# Patient Record
Sex: FEMALE | Race: ASIAN
[De-identification: names, ages, dates, MRNs, and addresses within clinical notes are randomized per-mention and may not be internally consistent; named-entity substitution may affect disease eponyms.]

---

## 2019-04-08 ENCOUNTER — HOSPITAL ENCOUNTER (INPATIENT)
Dept: HOSPITAL 5 - ED | Age: 74
LOS: 3 days | Discharge: HOME | DRG: 682 | End: 2019-04-11
Attending: INTERNAL MEDICINE | Admitting: INTERNAL MEDICINE
Payer: MEDICARE

## 2019-04-08 DIAGNOSIS — E03.9: ICD-10-CM

## 2019-04-08 DIAGNOSIS — E87.5: ICD-10-CM

## 2019-04-08 DIAGNOSIS — Z79.899: ICD-10-CM

## 2019-04-08 DIAGNOSIS — E11.22: ICD-10-CM

## 2019-04-08 DIAGNOSIS — Z99.2: ICD-10-CM

## 2019-04-08 DIAGNOSIS — Z82.49: ICD-10-CM

## 2019-04-08 DIAGNOSIS — N25.81: ICD-10-CM

## 2019-04-08 DIAGNOSIS — Z83.3: ICD-10-CM

## 2019-04-08 DIAGNOSIS — E43: ICD-10-CM

## 2019-04-08 DIAGNOSIS — I12.0: Primary | ICD-10-CM

## 2019-04-08 DIAGNOSIS — E87.2: ICD-10-CM

## 2019-04-08 DIAGNOSIS — N18.6: ICD-10-CM

## 2019-04-08 DIAGNOSIS — D63.1: ICD-10-CM

## 2019-04-08 DIAGNOSIS — Z79.4: ICD-10-CM

## 2019-04-08 DIAGNOSIS — E83.51: ICD-10-CM

## 2019-04-08 DIAGNOSIS — I16.0: ICD-10-CM

## 2019-04-08 LAB
ALBUMIN SERPL-MCNC: 2.8 G/DL (ref 3.9–5)
ALT SERPL-CCNC: 103 UNITS/L (ref 7–56)
BASOPHILS # (AUTO): 0.1 K/MM3 (ref 0–0.1)
BASOPHILS NFR BLD AUTO: 1.5 % (ref 0–1.8)
BUN SERPL-MCNC: 103 MG/DL (ref 7–17)
BUN/CREAT SERPL: 8 %
CALCIUM SERPL-MCNC: 5.6 MG/DL (ref 8.4–10.2)
EOSINOPHIL # BLD AUTO: 0.1 K/MM3 (ref 0–0.4)
EOSINOPHIL NFR BLD AUTO: 2.1 % (ref 0–4.3)
HCT VFR BLD CALC: 27.9 % (ref 30.3–42.9)
HEMOLYSIS INDEX: 4
HGB BLD-MCNC: 9 GM/DL (ref 10.1–14.3)
INR PPP: 0.95 (ref 0.87–1.13)
LYMPHOCYTES # BLD AUTO: 0.7 K/MM3 (ref 1.2–5.4)
LYMPHOCYTES NFR BLD AUTO: 13.8 % (ref 13.4–35)
MCHC RBC AUTO-ENTMCNC: 32 % (ref 30–34)
MCV RBC AUTO: 90 FL (ref 79–97)
MONOCYTES # (AUTO): 0.4 K/MM3 (ref 0–0.8)
MONOCYTES % (AUTO): 7.3 % (ref 0–7.3)
PLATELET # BLD: 345 K/MM3 (ref 140–440)
RBC # BLD AUTO: 3.09 M/MM3 (ref 3.65–5.03)

## 2019-04-08 PROCEDURE — C1750 CATH, HEMODIALYSIS,LONG-TERM: HCPCS

## 2019-04-08 PROCEDURE — 76937 US GUIDE VASCULAR ACCESS: CPT

## 2019-04-08 PROCEDURE — 85610 PROTHROMBIN TIME: CPT

## 2019-04-08 PROCEDURE — 85025 COMPLETE CBC W/AUTO DIFF WBC: CPT

## 2019-04-08 PROCEDURE — 93970 EXTREMITY STUDY: CPT

## 2019-04-08 PROCEDURE — 71046 X-RAY EXAM CHEST 2 VIEWS: CPT

## 2019-04-08 PROCEDURE — 83970 ASSAY OF PARATHORMONE: CPT

## 2019-04-08 PROCEDURE — C1769 GUIDE WIRE: HCPCS

## 2019-04-08 PROCEDURE — 83036 HEMOGLOBIN GLYCOSYLATED A1C: CPT

## 2019-04-08 PROCEDURE — 80074 ACUTE HEPATITIS PANEL: CPT

## 2019-04-08 PROCEDURE — 80053 COMPREHEN METABOLIC PANEL: CPT

## 2019-04-08 PROCEDURE — 99285 EMERGENCY DEPT VISIT HI MDM: CPT

## 2019-04-08 PROCEDURE — 77001 FLUOROGUIDE FOR VEIN DEVICE: CPT

## 2019-04-08 PROCEDURE — 36558 INSERT TUNNELED CV CATH: CPT

## 2019-04-08 PROCEDURE — 82962 GLUCOSE BLOOD TEST: CPT

## 2019-04-08 PROCEDURE — 36415 COLL VENOUS BLD VENIPUNCTURE: CPT

## 2019-04-08 RX ADMIN — HEPARIN SODIUM SCH UNIT: 5000 INJECTION, SOLUTION INTRAVENOUS; SUBCUTANEOUS at 22:23

## 2019-04-08 RX ADMIN — Medication SCH ML: at 22:24

## 2019-04-08 RX ADMIN — CARVEDILOL SCH MG: 12.5 TABLET, FILM COATED ORAL at 22:22

## 2019-04-08 RX ADMIN — CALCIUM CARBONATE (ANTACID) CHEW TAB 500 MG SCH MG: 500 CHEW TAB at 21:13

## 2019-04-08 NOTE — HISTORY AND PHYSICAL REPORT
History of Present Illness


Chief complaint: 





I need dialysis


History of present illness: 


72 YO Female with ESRD previously non accepting of dialysis, HTN, DM presents to

ED for evaluation. Pt states that she was informed several months ago that she 

needed dialysis but wanted to undergo kidney transplantation. Pt states that she

has experienced nausea, fatigue, generalized weakness, and not feeling well over

the past 1 month with worsening symptoms over the past. Pt awoke this morning 

"feeling bad" and decided to undergo dialysis as previously advised. Pt 

transported to [facility] [place] vehicle. Pt seen and evaluated in ED and found

to have ESRD, Acidosis, Hyperkalemia without EKG changes, and Anemia of Chronic 

Disease. Pt admitted to medical floor with remote telemetry. Nephrology 

consulted in ED. Vascular Surgery consulted for permacath placement. No prior 

admissions for review. All listed medication reconciled at time of admission. Pt

denies fever, chills, CP, Palpitations, shortness of breath, leg swelling, calf 

pain, prolonged travel/immobility, individual/family history of DVT/PE/Blood 

Clotting Disorder, vertigo, headache, vision changes, extremity numbness, or 

recent ill contacts. 








Past History


Past Medical History: diabetes, ESRD, hypertension, hypothyroidism


Past Surgical History: 


Social history: .  denies: smoking, alcohol abuse, prescription drug 

abuse


Family history: diabetes, hypertension





Medications and Allergies


                                    Allergies











Allergy/AdvReac Type Severity Reaction Status Date / Time


 


No Known Allergies Allergy   Unverified 16 15:27











                                Home Medications











 Medication  Instructions  Recorded  Confirmed  Last Taken  Type


 


Carvedilol [Coreg] 25 mg PO BID 19 Unknown History


 


Furosemide [Lasix TAB] 40 mg PO QDAY 19 Unknown History


 


Hydralazine HCl 50 mg PO TID 19 Unknown History


 


Insulin Glargine,Hum.rec.anlog 14 units SQ DAILY 19 Unknown 

History





[Lantus]     


 


Levothyroxine [Synthroid] 75 mcg PO QAM 19 Unknown History


 


Rosuvastatin Calcium 5 mg PO DAILY 19 Unknown History


 


glipiZIDE [Glipizide] 5 mg PO DAILY 19 Unknown History














Review of Systems


Constitutional: no weight loss, no weight gain, no fever, no chills


Ears, nose, mouth and throat: no ear pain, no ear discharge, no tinnitis, no 

decreased hearing, no nose pain


Breasts: no change in shape, no swelling, no mass


Cardiovascular: no chest pain, no orthopnea, no palpitations, no rapid/irregular

 heart beat, no edema


Respiratory: no cough, no cough with sputum, no excessive sputum, no hemoptysis,

 no shortness of breath


Gastrointestinal: nausea, no vomiting, no diarrhea, no constipation


Genitourinary Female: no pelvic pain, no flank pain, no menorrhagia, no dysuria,

 no urinary frequency, no urgency


Rectal: no pain, no incontinence, no bleeding


Musculoskeletal: no neck pain, no shooting arm pain, no arm numbness/tingling, 

no low back pain, no shooting leg pain, no leg numbness/tingling


Integumentary: no rash, no pruritis, no redness, no sores, no wounds


Neurological: no paralysis, no weakness, no parathesias, no numbness, no 

tingling


Psychiatric: no anxiety, no memory loss, no change in sleep habits, no sleep 

disturbances, no insomnia, no hypersomnia, no change in appetite


Endocrine: no cold intolerance, no heat intolerance, no polyuria, no nocturia, 

no excessive sweating


Hematologic/Lymphatic: no easy bruising, no easy bleeding, no lymphadenopathy, 

no lymphedema


Allergic/Immunologic: no urticaria, no allergic rhinitis, no wheezing, no 

persistent infections, no anaphylaxis, no angioedema





Exam





- Constitutional


Vitals: 


                                        











Temp Pulse Resp BP Pulse Ox


 


 98.2 F   71   16   194/90   96 


 


 19 09:22  19 09:22  19 09:22  19 09:22  19 09:22











General appearance: Present: no acute distress





- EENT


Eyes: Present: PERRL


ENT: hearing intact, clear oral mucosa





- Neck


Neck: Present: supple, normal ROM





- Respiratory


Respiratory effort: normal


Respiratory: bilateral: CTA





- Cardiovascular


Heart Sounds: Present: S1 & S2.  Absent: rub, click





- Extremities


Extremities: pulses symmetrical, No edema


Peripheral Pulses: within normal limits





- Abdominal


General gastrointestinal: Present: soft, non-tender, non-distended, normal bowel

 sounds


Female genitourinary: Present: normal





- Integumentary


Integumentary: Present: clear, warm, dry





- Musculoskeletal


Musculoskeletal: gait normal, strength equal bilaterally





- Psychiatric


Psychiatric: appropriate mood/affect, intact judgment & insight





- Neurologic


Neurologic: CNII-XII intact, moves all extremities





Results





- Labs


CBC & Chem 7: 


                                 19 09:28





                                 19 09:31


Labs: 


                              Abnormal lab results











  19 Range/Units





  09:28 09:31 


 


RBC  3.09 L   (3.65-5.03)  M/mm3


 


Hgb  9.0 L   (10.1-14.3)  gm/dl


 


Hct  27.9 L   (30.3-42.9)  %


 


RDW  16.0 H   (13.2-15.2)  %


 


Lymph #  0.7 L   (1.2-5.4)  K/mm3


 


Seg Neutrophils %  75.3 H   (40.0-70.0)  %


 


Potassium   5.2 H  (3.6-5.0)  mmol/L


 


Carbon Dioxide   15 L  (22-30)  mmol/L


 


BUN   103 H  (7-17)  mg/dL


 


Creatinine   12.6 H  (0.7-1.2)  mg/dL


 


Glucose   161 H  ()  mg/dL


 


Calcium   5.6 L*  (8.4-10.2)  mg/dL


 


ALT   103 H  (7-56)  units/L


 


Total Protein   5.6 L  (6.3-8.2)  g/dL


 


Albumin   2.8 L  (3.9-5)  g/dL














Assessment and Plan





- Patient Problems


(1) ESRD needing dialysis


Current Visit: Yes   Status: Acute   


Plan to address problem: 


Nephrology consulted for dialysis, dialysis as per renal team, Vascular surgery 

consulted 








(2) Acidosis


Current Visit: Yes   Status: Acute   


Plan to address problem: 


IV sodium bicarbonate x 1, repeat bmp in am. 








(3) Diabetes


Current Visit: Yes   Status: Acute   


Plan to address problem: 


ADA diet, insulin, accu check, discontinue oral antihyperglycemic medication 

during hospital stay. 








(4) HTN (hypertension)


Current Visit: Yes   Status: Acute   


Qualifiers: 


   Hypertension type: essential hypertension   Qualified Code(s): I10 - 

Essential (primary) hypertension   


Plan to address problem: 


monitor BP q shift, continue prehospital antihypertensive therapy, IV hydralaz

ine prn








(5) Hyperkalemia


Current Visit: Yes   Status: Acute   


Plan to address problem: 


No EKG changes, kayexelate, Calcium gluconate, repeat bmp in am. 








(6) DVT prophylaxis


Current Visit: Yes   Status: Acute   


Plan to address problem: 


SCD to BLE while in bed, prophylactic heparin

## 2019-04-08 NOTE — EMERGENCY DEPARTMENT REPORT
ED General Adult HPI





- General


Chief complaint: High BP


Stated complaint: KIDNEY DIALYSIS/DOC ORDERED


Time Seen by Provider: 19 10:43


Source: patient


Mode of arrival: Ambulatory


Limitations: No Limitations





- History of Present Illness


Initial comments: 





73-year-old female with a past medical history diabetes and hypertension 

presents to the Hospital complains of needing dialysis.  Patient states that Dr. Murphy sent her to the hospital for dialysis.  After discussion with [facility]

Nurse practitioner for Dr. Murphy is seems that Dr. Murphy has been 

trying to convince the patient since October that she needed to consider 

dialysis.  Apparently recently she went Dr. Garland for "second opinion was 

subsequently sent to the ER for treatment.  Patient complains of feeling 

fatigued and tired but denies any pain or shortness of breath.  Patient states 

she is not having about needing dialysis and is hopeful that she will get a 

kidney transplant.


Severity scale (0 -10): 0





- Related Data


                                Home Medications











 Medication  Instructions  Recorded  Confirmed  Last Taken


 


Carvedilol [Coreg] 12.5 mg PO BID 16 Unknown


 


Insulin NPH/Regular [Novolin 70/30] 20 unit SQ QAM 16 Unknown


 


Levothyroxine [Synthroid] 50 mcg PO QAM 16 Unknown


 


Lisinopril [Zestril] 40 mg PO QDAY 16 Unknown


 


glipiZIDE [Glucotrol] 10 mg PO QDAY 16 Unknown











                                    Allergies











Allergy/AdvReac Type Severity Reaction Status Date / Time


 


No Known Allergies Allergy   Unverified 16 15:27














ED Review of Systems


ROS: 


Stated complaint: KIDNEY DIALYSIS/DOC ORDERED


Other details as noted in HPI








ED Past Medical Hx





- Past Medical History


Previous Medical History?: Yes


Hx Hypertension: Yes


Hx Diabetes: Yes


Hx Renal Disease: Yes ("abnormal liver function")





- Surgical History


Past Surgical History?: Yes


Additional Surgical History: 





- Social History


Smoking Status: Never Smoker


Substance Use Type: None





- Medications


Home Medications: 


                                Home Medications











 Medication  Instructions  Recorded  Confirmed  Last Taken  Type


 


Carvedilol [Coreg] 12.5 mg PO BID 16 Unknown History


 


Insulin NPH/Regular [Novolin 70/30] 20 unit SQ QAM 16 Unknown 

History


 


Levothyroxine [Synthroid] 50 mcg PO QAM 16 Unknown History


 


Lisinopril [Zestril] 40 mg PO QDAY 16 Unknown History


 


glipiZIDE [Glucotrol] 10 mg PO QDAY 16 Unknown History














ED Physical Exam





- General


Limitations: No Limitations





ED Course


                                   Vital Signs











  19





  09:22


 


Temperature 98.2 F


 


Pulse Rate 71


 


Respiratory 16





Rate 


 


Blood Pressure 194/90


 


O2 Sat by Pulse 96





Oximetry 














- Consultations


Consultation #1: 





19 11:17


Case discussed with Coco STEELE for Dr. Murphy who confirmed that they sent 

patient did not send patient to the ER today but patient will need emergent 

dialysis





19 11:28


refugio with vascular paged.


19 11:40


case d/w with refugio who will evaluate pt to determine if access can be placed 

later today or tomorrow





ED Medical Decision Making





- Lab Data


Result diagrams: 


                                 19 09:28





                                 19 09:31








                                   Lab Results











  19 Range/Units





  09:28 09:31 


 


WBC  4.9   (4.5-11.0)  K/mm3


 


RBC  3.09 L   (3.65-5.03)  M/mm3


 


Hgb  9.0 L   (10.1-14.3)  gm/dl


 


Hct  27.9 L   (30.3-42.9)  %


 


MCV  90   (79-97)  fl


 


MCH  29   (28-32)  pg


 


MCHC  32   (30-34)  %


 


RDW  16.0 H   (13.2-15.2)  %


 


Plt Count  345   (140-440)  K/mm3


 


Lymph % (Auto)  13.8   (13.4-35.0)  %


 


Mono % (Auto)  7.3   (0.0-7.3)  %


 


Eos % (Auto)  2.1   (0.0-4.3)  %


 


Baso % (Auto)  1.5   (0.0-1.8)  %


 


Lymph #  0.7 L   (1.2-5.4)  K/mm3


 


Mono #  0.4   (0.0-0.8)  K/mm3


 


Eos #  0.1   (0.0-0.4)  K/mm3


 


Baso #  0.1   (0.0-0.1)  K/mm3


 


Seg Neutrophils %  75.3 H   (40.0-70.0)  %


 


Seg Neutrophils #  3.7   (1.8-7.7)  K/mm3


 


Sodium   139  (137-145)  mmol/L


 


Potassium   5.2 H  (3.6-5.0)  mmol/L


 


Chloride   106.7  ()  mmol/L


 


Carbon Dioxide   15 L  (22-30)  mmol/L


 


Anion Gap   23  mmol/L


 


BUN   103 H  (7-17)  mg/dL


 


Creatinine   12.6 H  (0.7-1.2)  mg/dL


 


Estimated GFR   3  ml/min


 


BUN/Creatinine Ratio   8  %


 


Glucose   161 H  ()  mg/dL


 


Calcium   5.6 L*  (8.4-10.2)  mg/dL


 


Total Bilirubin   0.20  (0.1-1.2)  mg/dL


 


AST   37  (5-40)  units/L


 


ALT   103 H  (7-56)  units/L


 


Alkaline Phosphatase   123  ()  units/L


 


Total Protein   5.6 L  (6.3-8.2)  g/dL


 


Albumin   2.8 L  (3.9-5)  g/dL


 


Albumin/Globulin Ratio   1.0  %














- Medical Decision Making





Patient will be admitted to the hospital to initiate dialysis.  Case discussed 

with Coco with Dr. Xiong and vascular consulted. NPO except for meds








- Differential Diagnosis


end-stage renal disease, hyperkalemia, volume overload


Critical Care Time: No


Critical care attestation.: 


If time is entered above; I have spent that time in minutes in the direct care 

of this critically ill patient, excluding procedure time.








ED Disposition


Clinical Impression: 


 ESRD needing dialysis, Uremia, HTN (hypertension), Diabetes, Anemia





Disposition:  OP ADMIT IP TO THIS HOSP


Is pt being admited?: Yes


Condition: Stable


Time of Disposition: 11:11 (Dr Aguilar/hosp)

## 2019-04-08 NOTE — CONSULTATION
History of Present Illness





- Reason for Consult


Consult date: 19


end stage renal disease


Requesting physician: DEJA KAHN





- History of Present Illness





72 YO Female with ESRD previously non accepting of dialysis, HTN, DM presents to

ED for evaluation. Pt states that she was informed several months ago that she 

needed dialysis but wanted to undergo kidney transplantation. Pt states that she

has experienced nausea, fatigue, generalized weakness, and not feeling well over

the past 1 month with worsening symptoms over the past. Pt awoke this morning 

"feeling bad" and decided to undergo dialysis as previously advised. Pt 

transported to [facility] ED via private vehicle. Pt seen and evaluated in ED and found

to have ESRD, Acidosis, Hyperkalemia without EKG changes, and Anemia of Chronic 

Disease. Pt admitted to medical floor with remote telemetry. Nephrology con

sulted in ED. Vascular Surgery consulted for permacath placement. No prior 

admissions for review. All listed medication reconciled at time of admission. Pt

denies fever, chills, CP, Palpitations, shortness of breath, leg swelling, calf 

pain, prolonged travel/immobility, individual/family history of DVT/PE/Blood 

Clotting Disorder, vertigo, headache, vision changes, extremity numbness, or 

recent ill contacts. 








Past History


Past Medical History: diabetes, ESRD, hypertension, hypothyroidism


Past Surgical History: 


Social history: .  denies: smoking, alcohol abuse, prescription drug 

abuse


Family history: diabetes, hypertension





Medications and Allergies


                                    Allergies











Allergy/AdvReac Type Severity Reaction Status Date / Time


 


No Known Allergies Allergy   Unverified 16 15:27











                                Home Medications











 Medication  Instructions  Recorded  Confirmed  Last Taken  Type


 


Carvedilol [Coreg] 25 mg PO BID 19 Unknown History


 


Furosemide [Lasix TAB] 40 mg PO QDAY 19 Unknown History


 


Hydralazine HCl 50 mg PO TID 19 Unknown History


 


Insulin Glargine,Hum.rec.anlog 14 units SQ DAILY 19 Unknown 

History





[Lantus]     


 


Levothyroxine [Synthroid] 75 mcg PO QAM 19 Unknown History


 


Rosuvastatin Calcium 5 mg PO DAILY 19 Unknown History


 


glipiZIDE [Glipizide] 5 mg PO DAILY 19 Unknown History














Review of Systems


Constitutional: no weight loss, no weight gain, no fever, no chills


Ears, nose, mouth and throat: no ear pain, no ear discharge, no tinnitis, no 

decreased hearing, no nose pain


Breasts: no change in shape, no swelling, no mass


Cardiovascular: no chest pain, no orthopnea, no palpitations, no rapid/irregular

 heart beat, no edema


Respiratory: no cough, no cough with sputum, no excessive sputum, no hemoptysis,

 no shortness of breath


Gastrointestinal: nausea, no vomiting, no diarrhea, no constipation


Genitourinary Female: no pelvic pain, no flank pain, no menorrhagia, no dysuria,

 no urinary frequency, no urgency


Rectal: no pain, no incontinence, no bleeding


Musculoskeletal: no neck pain, no shooting arm pain, no arm numbness/tingling, 

no low back pain, no shooting leg pain, no leg numbness/tingling


Integumentary: no rash, no pruritis, no redness, no sores, no wounds


Neurological: no paralysis, no weakness, no parathesias, no numbness, no 

tingling


Psychiatric: no anxiety, no memory loss, no change in sleep habits, no sleep 

disturbances, no insomnia, no hypersomnia, no change in appetite


Endocrine: no cold intolerance, no heat intolerance, no polyuria, no nocturia, 

no excessive sweating


Hematologic/Lymphatic: no easy bruising, no easy bleeding, no lymphadenopathy, 

no lymphedema


Allergic/Immunologic: no urticaria, no allergic rhinitis, no wheezing, no 

persistent infections, no anaphylaxis, no angioedema











Past History


Past Medical History: diabetes, ESRD, hypertension, hypothyroidism


Past Surgical History: , Other (Thyroidectomy, eye surgery due to 

hemorrhage and detatched retina )


Social history: .  denies: smoking, alcohol abuse, prescription drug 

abuse


Family history: diabetes, hypertension





Medications and Allergies


                                    Allergies











Allergy/AdvReac Type Severity Reaction Status Date / Time


 


No Known Allergies Allergy   Unverified 16 15:27











                                Home Medications











 Medication  Instructions  Recorded  Confirmed  Last Taken  Type


 


Carvedilol [Coreg] 25 mg PO BID 19 Unknown History


 


Furosemide [Lasix TAB] 40 mg PO QDAY 19 Unknown History


 


Hydralazine HCl 50 mg PO TID 19 Unknown History


 


Insulin Glargine,Hum.rec.anlog 14 units SQ DAILY 19 Unknown 

History





[Lantus]     


 


Levothyroxine [Synthroid] 75 mcg PO QAM 19 Unknown History


 


Rosuvastatin Calcium 5 mg PO DAILY 19 Unknown History


 


glipiZIDE [Glipizide] 5 mg PO DAILY 19 Unknown History











Active Meds: 


Active Medications





Acetaminophen (Tylenol)  650 mg PO Q4H PRN


   PRN Reason: Pain MILD(1-3)/Fever >100.5/HA


Carvedilol (Coreg)  12.5 mg PO BID MARK


Dextrose (D50w (25gm) Syringe)  50 ml IV PRN PRN


   PRN Reason: Hypoglycemia


Heparin Sodium (Porcine) (Heparin)  5,000 unit SUB-Q Q12HR MARK


Hydralazine HCl (Apresoline)  10 mg IV Q4HR PRN


   PRN Reason: HTN SYS>155


Sodium Chloride (Nacl 0.9%)  100 mls @ 999 mls/hr IV MICHELLE PRN


   PRN Reason: Hypotension


Insulin Human Lispro (Humalog)  0 unit SUB-Q Q6HR MARK; Protocol


Levothyroxine Sodium (Synthroid)  50 mcg PO DAILY@0600 MARK


Lisinopril (Zestril)  40 mg PO QDAY MARK


Ondansetron HCl (Zofran)  4 mg IV Q8H PRN


   PRN Reason: Nausea And Vomiting


Sodium Chloride (Sodium Chloride Flush Syringe 10 Ml)  10 ml IV BID MARK


Sodium Chloride (Sodium Chloride Flush Syringe 10 Ml)  10 ml IV PRN PRN


   PRN Reason: LINE FLUSH











Exam





- Vital Signs


Vital signs: 


                                   Vital Signs











Temp Pulse Resp BP Pulse Ox


 


 98.2 F   71   16   194/90   96 


 


 19 09:22  19 09:22  19 09:22  19 09:22  19 09:22














- Physical Exam


Narrative exam: 





General appearance: Present: no acute distress





- EENT


Eyes: Present: PERRL


ENT: hearing intact, clear oral mucosa





- Neck


Neck: Present: supple, normal ROM





- Respiratory


Respiratory effort: normal


Respiratory: bilateral: CTA





- Cardiovascular


Heart Sounds: Present: S1 & S2.  Absent: rub, click





- Extremities


Extremities: pulses symmetrical, No edema


Peripheral Pulses: within normal limits





- Abdominal


General gastrointestinal: Present: soft, non-tender, non-distended, normal bowel

 sounds


Female genitourinary: Present: normal





- Integumentary


Integumentary: Present: clear, warm, dry





- Musculoskeletal


Musculoskeletal: gait normal, strength equal bilaterally





- Psychiatric


Psychiatric: appropriate mood/affect, intact judgment & insight





- Neurologic


Neurologic: CNII-XII intact, moves all extremities





Results





- Lab Results





                                 19 09:28





                                 19 09:31


                             Most recent lab results











Calcium  5.6 mg/dL (8.4-10.2)  L*  19  09:31    














Assessment and Plan





IMpression:


* ESRD


* Uremia 


* HTN


* anemia in ESRD








PLan:


* intiated hemodialysis after access placement


* outpatient hd placement


* strict i/o


* uf with hd as tolerated


* daily lytes


* epogen with HD


* home once hd unit arranged

## 2019-04-08 NOTE — CONSULTATION
<LEAH RIGGINS - Last Filed: 19 15:26>





History of Present Illness





- Reason for Consult


Consult date: 19


Provide Hemo-dialysis access


Requesting physician: JUAN MANN





- History of Present Illness





This pt is a 72 yo  female admitted via the ER due to progression of renal 

failure with need to begin hemodialysis. The pt has been followed by Nephrology 

service, who recommended the pt consider HD over the last 7 months. The pt has 

been resistant hoping her kidney function would improve. She was evaluated by 

another service for a second opinion, who agreed she would need to start HD. She

was sent to the ER where she has since been admitted. A vascular surgery consult

was requested to evaluated for Perma-cath insertion.





Past History


Past Medical History: diabetes, ESRD, hypertension, hypothyroidism


Past Surgical History: , Other (Thyroidectomy, eye surgery due to 

hemorrhage and detatched retina )


Social history: .  denies: smoking, alcohol abuse, prescription drug 

abuse


Family history: diabetes, hypertension





Medications and Allergies


                                    Allergies











Allergy/AdvReac Type Severity Reaction Status Date / Time


 


No Known Allergies Allergy   Unverified 16 15:27











                                Home Medications











 Medication  Instructions  Recorded  Confirmed  Last Taken  Type


 


Carvedilol [Coreg] 25 mg PO BID 19 Unknown History


 


Furosemide [Lasix TAB] 40 mg PO QDAY 19 Unknown History


 


Hydralazine HCl 50 mg PO TID 19 Unknown History


 


Insulin Glargine,Hum.rec.anlog 14 units SQ DAILY 19 Unknown 

History





[Lantus]     


 


Levothyroxine [Synthroid] 75 mcg PO QAM 19 Unknown History


 


Rosuvastatin Calcium 5 mg PO DAILY 19 Unknown History


 


glipiZIDE [Glipizide] 5 mg PO DAILY 19 Unknown History











Active Meds: 


Active Medications





Acetaminophen (Tylenol)  650 mg PO Q4H PRN


   PRN Reason: Pain MILD(1-3)/Fever >100.5/HA


Carvedilol (Coreg)  12.5 mg PO BID MARK


Dextrose (D50w (25gm) Syringe)  50 ml IV PRN PRN


   PRN Reason: Hypoglycemia


Heparin Sodium (Porcine) (Heparin)  5,000 unit SUB-Q Q12HR [facility]


Hydralazine HCl (Apresoline)  10 mg IV Q4HR PRN


   PRN Reason: HTN SYS>155


Sodium Chloride (Nacl 0.9%)  100 mls @ 999 mls/hr IV MICHELLE PRN


   PRN Reason: Hypotension


Insulin Human Lispro (Humalog)  0 unit SUB-Q Q6HR [facility]; Protocol


Levothyroxine Sodium (Synthroid)  50 mcg PO DAILY@0600 [facility]


Lisinopril (Zestril)  40 mg PO QDAY MARK


Ondansetron HCl (Zofran)  4 mg IV Q8H PRN


   PRN Reason: Nausea And Vomiting


Sodium Chloride (Sodium Chloride Flush Syringe 10 Ml)  10 ml IV BID MARK


Sodium Chloride (Sodium Chloride Flush Syringe 10 Ml)  10 ml IV PRN PRN


   PRN Reason: LINE FLUSH











Review of Systems


All systems: negative





Exam





- Constitutional


Vitals: 


                                        











Temp Pulse Resp BP Pulse Ox


 


 98.1 F   66   18   174/83   95 


 


 19 13:35  19 13:35  19 13:35  19 13:35  19 13:35











General appearance: Present: no acute distress





- EENT


Eyes: Present: EOM intact


ENT: hearing intact





- Neck


Neck: Present: supple





- Respiratory


Respiratory effort: normal





- Extremities


Extremities: no ischemia, No edema





- Psychiatric


Psychiatric: appropriate mood/affect, intact judgment & insight, cooperative





- Neurologic


Neurologic: no focal deficits





Results





- Labs


CBC & Chem 7: 


                                 19 09:28





                                 19 09:31


Labs: 


                              Abnormal lab results











  19 Range/Units





  09:28 09:31 


 


RBC  3.09 L   (3.65-5.03)  M/mm3


 


Hgb  9.0 L   (10.1-14.3)  gm/dl


 


Hct  27.9 L   (30.3-42.9)  %


 


RDW  16.0 H   (13.2-15.2)  %


 


Lymph #  0.7 L   (1.2-5.4)  K/mm3


 


Seg Neutrophils %  75.3 H   (40.0-70.0)  %


 


Potassium   5.2 H  (3.6-5.0)  mmol/L


 


Carbon Dioxide   15 L  (22-30)  mmol/L


 


BUN   103 H  (7-17)  mg/dL


 


Creatinine   12.6 H  (0.7-1.2)  mg/dL


 


Glucose   161 H  ()  mg/dL


 


Calcium   5.6 L*  (8.4-10.2)  mg/dL


 


ALT   103 H  (7-56)  units/L


 


Total Protein   5.6 L  (6.3-8.2)  g/dL


 


Albumin   2.8 L  (3.9-5)  g/dL














Assessment and Plan





This pt was admitted via the ER due to ESRD needing to start HD. A vascular 

surgery consult was requested to evaluate for HD access/PermaCath. I explained 

the procedure, including all risk, benefits, and alternatives. We also discussed

 the risk of long term catheter based HD. I explained that if she ultimately 

will need long term HD, then we can evaluate her for fistula creation. She is 

right hand dominant, therefore I recommended no further venipunctures in her 

LUE.





After contemplating the proposed procedure, she has agreed to proceed. This will

 be scheduled for the cath lab in the am. She will be NPO after Mn except meds. 





- Patient Problems


(1) ESRD needing dialysis


Current Visit: Yes   Status: Acute   





(2) Hyperkalemia


Current Visit: Yes   Status: Acute   





(3) Diabetes


Current Visit: Yes   Status: Acute   





(4) HTN (hypertension)


Current Visit: Yes   Status: Acute   


Qualifiers: 


   Hypertension type: essential hypertension   Qualified Code(s): I10 - 

Essential (primary) hypertension   





(5) Uremia


Current Visit: Yes   Status: Acute   





(6) Anemia


Current Visit: Yes   Status: Acute   





<FEDERICO MEZA - Last Filed: 19 08:57>





Medications and Allergies


Active Meds: 


Active Medications





Acetaminophen (Tylenol)  650 mg PO Q4H PRN


   PRN Reason: Pain MILD(1-3)/Fever >100.5/HA


Calcium Carbonate/Glycine (Tums)  1,000 mg PO TID [facility]


   Last Admin: 19 21:13 Dose:  1,000 mg


   Documented by: 


Carvedilol (Coreg)  12.5 mg PO BID [facility]


   Last Admin: 19 22:22 Dose:  12.5 mg


   Documented by: 


Dextrose (D50w (25gm) Syringe)  50 ml IV PRN PRN


   PRN Reason: Hypoglycemia


   Last Admin: 19 06:37 Dose:  50 ml


   Documented by: 


Heparin Sodium (Porcine) (Heparin)  5,000 unit SUB-Q Q12HR [facility]


   Last Admin: 19 22:23 Dose:  5,000 unit


   Documented by: 


Hydralazine HCl (Apresoline)  10 mg IV Q4HR PRN


   PRN Reason: HTN SYS>155


Sodium Chloride (Nacl 0.9%)  100 mls @ 999 mls/hr IV MICHELLE PRN


   PRN Reason: Hypotension


Sodium Chloride (Nacl 0.9% 500 Ml)  500 mls @ 50 mls/hr IV AS DIRECT [facility]


   Last Admin: 19 08:35 Dose:  0 mls


   Documented by: 


Insulin Human Lispro (Humalog)  0 unit SUB-Q Q6HR [facility]; Protocol


   Last Admin: 19 06:41 Dose:  Not Given


   Documented by: 


Levothyroxine Sodium (Synthroid)  50 mcg PO DAILY@0600 [facility]


   Last Admin: 19 06:02 Dose:  Not Given


   Documented by: 


Lisinopril (Zestril)  40 mg PO QDAY [facility]


Ondansetron HCl (Zofran)  4 mg IV Q8H PRN


   PRN Reason: Nausea And Vomiting


   Last Admin: 19 00:36 Dose:  4 mg


   Documented by: 


Sodium Chloride (Sodium Chloride Flush Syringe 10 Ml)  10 ml IV BID [facility]


   Last Admin: 19 22:24 Dose:  10 ml


   Documented by: 


Sodium Chloride (Sodium Chloride Flush Syringe 10 Ml)  10 ml IV PRN PRN


   PRN Reason: LINE FLUSH











Exam





- Constitutional


Vitals: 


                                        











Temp Pulse Resp BP Pulse Ox


 


 98.3 F   64   17   184/80   95 


 


 19 07:47  19 07:47  19 07:47  19 07:47  19 07:47














Results





- Labs


CBC & Chem 7: 


                                 19 09:28





                                 19 09:31


Labs: 


                              Abnormal lab results











  19 Range/Units





  09:28 09:31 15:47 


 


RBC  3.09 L    (3.65-5.03)  M/mm3


 


Hgb  9.0 L    (10.1-14.3)  gm/dl


 


Hct  27.9 L    (30.3-42.9)  %


 


RDW  16.0 H    (13.2-15.2)  %


 


Lymph #  0.7 L    (1.2-5.4)  K/mm3


 


Seg Neutrophils %  75.3 H    (40.0-70.0)  %


 


Potassium   5.2 H   (3.6-5.0)  mmol/L


 


Carbon Dioxide   15 L   (22-30)  mmol/L


 


BUN   103 H   (7-17)  mg/dL


 


Creatinine   12.6 H   (0.7-1.2)  mg/dL


 


Glucose   161 H   ()  mg/dL


 


POC Glucose    153 H  ()  


 


Calcium   5.6 L*   (8.4-10.2)  mg/dL


 


ALT   103 H   (7-56)  units/L


 


Total Protein   5.6 L   (6.3-8.2)  g/dL


 


Albumin   2.8 L   (3.9-5)  g/dL


 


PTH Intact     (15-65)  pg/mL














  19 Range/Units





  16:11 16:28 23:05 


 


RBC     (3.65-5.03)  M/mm3


 


Hgb     (10.1-14.3)  gm/dl


 


Hct     (30.3-42.9)  %


 


RDW     (13.2-15.2)  %


 


Lymph #     (1.2-5.4)  K/mm3


 


Seg Neutrophils %     (40.0-70.0)  %


 


Potassium     (3.6-5.0)  mmol/L


 


Carbon Dioxide     (22-30)  mmol/L


 


BUN     (7-17)  mg/dL


 


Creatinine     (0.7-1.2)  mg/dL


 


Glucose     ()  mg/dL


 


POC Glucose   128 H  168 H  ()  


 


Calcium     (8.4-10.2)  mg/dL


 


ALT     (7-56)  units/L


 


Total Protein     (6.3-8.2)  g/dL


 


Albumin     (3.9-5)  g/dL


 


PTH Intact  268.2 H    (15-65)  pg/mL














  19 Range/Units





  00:36 06:25 07:03 


 


RBC     (3.65-5.03)  M/mm3


 


Hgb     (10.1-14.3)  gm/dl


 


Hct     (30.3-42.9)  %


 


RDW     (13.2-15.2)  %


 


Lymph #     (1.2-5.4)  K/mm3


 


Seg Neutrophils %     (40.0-70.0)  %


 


Potassium     (3.6-5.0)  mmol/L


 


Carbon Dioxide     (22-30)  mmol/L


 


BUN     (7-17)  mg/dL


 


Creatinine     (0.7-1.2)  mg/dL


 


Glucose     ()  mg/dL


 


POC Glucose  119 H  61 L  185 H  ()  


 


Calcium     (8.4-10.2)  mg/dL


 


ALT     (7-56)  units/L


 


Total Protein     (6.3-8.2)  g/dL


 


Albumin     (3.9-5)  g/dL


 


PTH Intact     (15-65)  pg/mL














Assessment and Plan





Review with assessment and conclusions.  Permacath will be placed for initiation

 of dialysis.

## 2019-04-09 PROCEDURE — B5181ZA FLUOROSCOPY OF SUPERIOR VENA CAVA USING LOW OSMOLAR CONTRAST, GUIDANCE: ICD-10-PCS | Performed by: SURGERY

## 2019-04-09 PROCEDURE — 02HV33Z INSERTION OF INFUSION DEVICE INTO SUPERIOR VENA CAVA, PERCUTANEOUS APPROACH: ICD-10-PCS | Performed by: SURGERY

## 2019-04-09 PROCEDURE — B548ZZA ULTRASONOGRAPHY OF SUPERIOR VENA CAVA, GUIDANCE: ICD-10-PCS | Performed by: SURGERY

## 2019-04-09 PROCEDURE — 5A1D70Z PERFORMANCE OF URINARY FILTRATION, INTERMITTENT, LESS THAN 6 HOURS PER DAY: ICD-10-PCS | Performed by: INTERNAL MEDICINE

## 2019-04-09 RX ADMIN — HYDRALAZINE HYDROCHLORIDE SCH: 25 TABLET, FILM COATED ORAL at 16:49

## 2019-04-09 RX ADMIN — HEPARIN SODIUM SCH UNIT: 5000 INJECTION, SOLUTION INTRAVENOUS; SUBCUTANEOUS at 21:20

## 2019-04-09 RX ADMIN — HYDRALAZINE HYDROCHLORIDE SCH MG: 25 TABLET, FILM COATED ORAL at 21:21

## 2019-04-09 RX ADMIN — INSULIN GLARGINE SCH UNITS: 100 INJECTION, SOLUTION SUBCUTANEOUS at 21:23

## 2019-04-09 RX ADMIN — HEPARIN SODIUM ONE UNIT: 1000 INJECTION, SOLUTION INTRAVENOUS; SUBCUTANEOUS at 08:50

## 2019-04-09 RX ADMIN — CALCITRIOL SCH MCG: 0.5 CAPSULE, LIQUID FILLED ORAL at 21:22

## 2019-04-09 RX ADMIN — CARVEDILOL SCH MG: 25 TABLET, FILM COATED ORAL at 21:23

## 2019-04-09 RX ADMIN — CALCIUM CARBONATE (ANTACID) CHEW TAB 500 MG SCH MG: 500 CHEW TAB at 21:21

## 2019-04-09 RX ADMIN — HEPARIN SODIUM SCH: 5000 INJECTION, SOLUTION INTRAVENOUS; SUBCUTANEOUS at 16:51

## 2019-04-09 RX ADMIN — Medication SCH ML: at 21:25

## 2019-04-09 RX ADMIN — CALCIUM CARBONATE (ANTACID) CHEW TAB 500 MG SCH: 500 CHEW TAB at 09:08

## 2019-04-09 RX ADMIN — NIFEDIPINE SCH MG: 60 TABLET, EXTENDED RELEASE ORAL at 21:22

## 2019-04-09 RX ADMIN — LISINOPRIL SCH MG: 40 TABLET ORAL at 16:56

## 2019-04-09 RX ADMIN — HEPARIN SODIUM SCH: 5000 INJECTION, SOLUTION INTRAVENOUS; SUBCUTANEOUS at 18:26

## 2019-04-09 RX ADMIN — CARVEDILOL SCH: 12.5 TABLET, FILM COATED ORAL at 18:25

## 2019-04-09 RX ADMIN — ONDANSETRON PRN MG: 2 INJECTION INTRAMUSCULAR; INTRAVENOUS at 00:36

## 2019-04-09 RX ADMIN — Medication SCH ML: at 16:57

## 2019-04-09 RX ADMIN — NIFEDIPINE SCH MG: 60 TABLET, EXTENDED RELEASE ORAL at 16:56

## 2019-04-09 RX ADMIN — HEPARIN SODIUM ONE UNIT: 1000 INJECTION, SOLUTION INTRAVENOUS; SUBCUTANEOUS at 08:49

## 2019-04-09 RX ADMIN — CALCIUM CARBONATE (ANTACID) CHEW TAB 500 MG SCH: 500 CHEW TAB at 16:50

## 2019-04-09 NOTE — XRAY REPORT
ROUTINE CHEST, TWO VIEWS:



HISTORY:  Short of breath.



Mild cardiomegaly, mild pulmonary venous congestion and small to medium 

left pleural effusion are identified. No obvious infiltrate. No 

pneumothorax. Right IJ dual-lumen catheter terminates in the superior 

right atrium.



IMPRESSION:

Mild volume overload/CHF.

## 2019-04-09 NOTE — VASCULAR LAB REPORT
PROCEDURE: BILATERAL UPPER EXTREMITY VEIN MAPPING 

 

TECHNIQUE:  Duplex Doppler ultrasound of the BILATERAL upper extremity veins and incompetent perforat
ors was attempted. Grey scale imaging with and without compression, spectral waveform analysis with a
nd without augmentation, and color flow Doppler were employed.  CPT 33471 

 

HISTORY: . Preparation for vein harvesting. 

 

COMPARISONS:  None . 

 

FINDINGS:  

 

RIGHT UPPER EXTREMITY: 

Brachial artery: 4.1 mm. 46.9 cm/s PSV 

Radial artery: 2.3 mm. PSV 45 cm/s 

Ulnar artery: 1.7 mm. PSV 36.2 cm/s 

Triphasic arterial waveforms are noted. 

     

BASILIC  Vein Upper Arm:     

Proximal diameter: 3.5 mm         

Mid diameter: 3.1 mm 

Distal diameter: Unable to visualize due to the presence of IV line 

Antecubital fossa: 3.1 mm 

 

BASILIC Vein Forearm:  

Proximal diameter: 1.7 mm         

 

CEPHALIC  Vein Upper Arm: 

Proximal diameter: 3.3 mm         

Mid diameter: 3.3 mm 

Distal diameter: 4.0 mm 

Antecubital fossa: 4.0 mm 

 

CEPHALIC Vein Forearm: 

Proximal diameter: 2.9 mm         

Mid diameter: 2.5 mm 

Distal diameter: 2.3 mm 

 

LEFT UPPER EXTREMITY: 

Brachial artery: 4.4 mm. PSV 47.4 cm/s. 

Radial artery: 2.5 mm. PSV 47.4 cm/s 

Ulnar artery: 2.2 mm. PSV 41.6 cm/s 

Triphasic arterial waveforms are noted. 

     

BASILIC Vein Upper Arm:     

Proximal diameter: 3.7 mm         

Mid diameter: 3.2 mm 

Distal diameter: 2.5 mm 

Antecubital fossa: 1.7 mm 

 

 

CEPHALIC Vein Upper Arm: 

Proximal diameter: 3.0 mm         

Mid diameter: 3.4 mm 

Distal diameter: 3.3 mm 

Antecubital fossa: 3.1 mm 

 

CEPHALIC Vein Forearm: 

Proximal diameter: 1.6 mm         

Mid diameter: 1.2 mm 

Distal diameter: 1.2 mm 

 

 

IMPRESSION:  Venous mapping measurements as detailed above.. 

 

This document is electronically signed by Rafal Kothari MD., April 9 2019 09:49:03 PM ET

## 2019-04-09 NOTE — PROGRESS NOTE
Assessment and Plan


Assessment and plan: 





73F w pmh of ESRD who was previously refusing dialysis, presents with worsening 

Uremia. she is planning for kidney transplant





pmh; dm, esrd, htn, hypothyroidism





Dx


esrd


uremia


htn urgency


AOCD


hyperkalemia


Severe malnutrition


DM





Plan


sp PC, cont HD


hg, stable, epo per neprhology


optimize bp meds


dietician consult


cont SSI, restarted home lantus, fup a1c


dvt ppx- heparin





History


Interval history: 





Review of systems


Constitutional: No fevers, no malaise, no joint pains





CVS: No chest pain, no orthopnea, no dyspnea on exertion, no pedal edema





GI: No abdominal pain, no diarrhea, no vomiting, no constipation





Respiratory: No shortness of breath, no wheezing, no coughing





Hospitalist Physical





- Physical exam


Narrative exam: 





General.: Appears well, no distress, nontoxic


HEENT: Moist mucous membranes, extraocular muscles intact, no lymphadenopathy


Neck: supple


Cardiac: S1-S2 heard


Lungs: clear to auscultation bilaterally


Abdomen: soft , nontender,  nondistended,  bowel sounds positive


Extremities: no edema clubbing or cyanosis


Skin: no rash or lesions


Neurologic: no gross focal deficits


Psych: calm, and cooperative





- Constitutional


Vitals: 


                                        











Temp Pulse Resp BP Pulse Ox


 


 98.5 F   58 L  14   190/90   92 


 


 04/09/19 09:10  04/09/19 10:15  04/09/19 10:15  04/09/19 10:15  04/09/19 10:15











General appearance: Present: no acute distress





Results





- Labs


CBC & Chem 7: 


                                 04/08/19 09:28





                                 04/08/19 09:31


Labs: 


                             Laboratory Last Values











WBC  4.9 K/mm3 (4.5-11.0)   04/08/19  09:28    


 


RBC  3.09 M/mm3 (3.65-5.03)  L  04/08/19  09:28    


 


Hgb  9.0 gm/dl (10.1-14.3)  L  04/08/19  09:28    


 


Hct  27.9 % (30.3-42.9)  L  04/08/19  09:28    


 


MCV  90 fl (79-97)   04/08/19  09:28    


 


MCH  29 pg (28-32)   04/08/19  09:28    


 


MCHC  32 % (30-34)   04/08/19  09:28    


 


RDW  16.0 % (13.2-15.2)  H  04/08/19  09:28    


 


Plt Count  345 K/mm3 (140-440)   04/08/19  09:28    


 


Lymph % (Auto)  13.8 % (13.4-35.0)   04/08/19  09:28    


 


Mono % (Auto)  7.3 % (0.0-7.3)   04/08/19  09:28    


 


Eos % (Auto)  2.1 % (0.0-4.3)   04/08/19  09:28    


 


Baso % (Auto)  1.5 % (0.0-1.8)   04/08/19  09:28    


 


Lymph #  0.7 K/mm3 (1.2-5.4)  L  04/08/19  09:28    


 


Mono #  0.4 K/mm3 (0.0-0.8)   04/08/19  09:28    


 


Eos #  0.1 K/mm3 (0.0-0.4)   04/08/19  09:28    


 


Baso #  0.1 K/mm3 (0.0-0.1)   04/08/19  09:28    


 


Seg Neutrophils %  75.3 % (40.0-70.0)  H  04/08/19  09:28    


 


Seg Neutrophils #  3.7 K/mm3 (1.8-7.7)   04/08/19  09:28    


 


PT  13.3 Sec. (12.2-14.9)   04/08/19  11:55    


 


INR  0.95  (0.87-1.13)   04/08/19  11:55    


 


Sodium  139 mmol/L (137-145)   04/08/19  09:31    


 


Potassium  5.2 mmol/L (3.6-5.0)  H  04/08/19  09:31    


 


Chloride  106.7 mmol/L ()   04/08/19  09:31    


 


Carbon Dioxide  15 mmol/L (22-30)  L  04/08/19  09:31    


 


Anion Gap  23 mmol/L  04/08/19  09:31    


 


BUN  103 mg/dL (7-17)  H  04/08/19  09:31    


 


Creatinine  12.6 mg/dL (0.7-1.2)  H  04/08/19  09:31    


 


Estimated GFR  3 ml/min  04/08/19  09:31    


 


BUN/Creatinine Ratio  8 %  04/08/19  09:31    


 


Glucose  161 mg/dL ()  H  04/08/19  09:31    


 


POC Glucose  185  ()  H  04/09/19  07:03    


 


Calcium  5.6 mg/dL (8.4-10.2)  L*  04/08/19  09:31    


 


Total Bilirubin  0.20 mg/dL (0.1-1.2)   04/08/19  09:31    


 


AST  37 units/L (5-40)   04/08/19  09:31    


 


ALT  103 units/L (7-56)  H  04/08/19  09:31    


 


Alkaline Phosphatase  123 units/L ()   04/08/19  09:31    


 


Total Protein  5.6 g/dL (6.3-8.2)  L  04/08/19  09:31    


 


Albumin  2.8 g/dL (3.9-5)  L  04/08/19  09:31    


 


Albumin/Globulin Ratio  1.0 %  04/08/19  09:31    


 


PTH Intact  268.2 pg/mL (15-65)  H  04/08/19  16:11    


 


Hepatitis A IgM Ab  Non-reactive  (NonReactive)   04/08/19  16:11    


 


Hep Bs Antigen  Non-reactive  (Negative)   04/08/19  16:11    


 


Hep B Core IgM Ab  Non-reactive  (NonReactive)   04/08/19  16:11    


 


Hepatitis C Antibody  Non-reactive  (NonReactive)   04/08/19  16:11    














Active Medications





- Current Medications


Current Medications: 














Generic Name Dose Route Start Last Admin





  Trade Name Freq  PRN Reason Stop Dose Admin


 


Acetaminophen  650 mg  04/08/19 11:47 





  Tylenol  PO  





  Q4H PRN  





  Pain MILD(1-3)/Fever >100.5/HA  


 


Calcium Carbonate/Glycine  1,000 mg  04/08/19 20:00  04/09/19 09:08





  Tums  PO   Not Given





  TID MARK  


 


Carvedilol  12.5 mg  04/08/19 22:00  04/08/19 22:22





  Coreg  PO   12.5 mg





  BID MARK   Administration


 


Dextrose  50 ml  04/08/19 12:09  04/09/19 06:37





  D50w (25gm) Syringe  IV   50 ml





  PRN PRN   Administration





  Hypoglycemia  


 


Heparin Sodium (Porcine)  5,000 unit  04/09/19 14:00 





  Heparin  SUB-Q  





  Q8HR [facility]  


 


Hydralazine HCl  10 mg  04/08/19 12:08 





  Apresoline  IV  





  Q4HR PRN  





  HTN SYS>155  


 


Sodium Chloride  100 mls @ 999 mls/hr  04/08/19 11:41 





  Nacl 0.9%  IV  





  MICHELLE PRN  





  Hypotension  


 


Sodium Chloride  500 mls @ 50 mls/hr  04/09/19 08:00  04/09/19 08:35





  Nacl 0.9% 500 Ml  IV   0 mls





  AS DIRECT MARK   Administration


 


Insulin Human Lispro  0 unit  04/08/19 18:00  04/09/19 06:41





  Humalog  SUB-Q   Not Given





  Q6HR [facility]  





  Protocol  


 


Levothyroxine Sodium  50 mcg  04/09/19 06:00  04/09/19 06:02





  Synthroid  PO   Not Given





  DAILY@0600 [facility]  


 


Lisinopril  40 mg  04/09/19 10:00 





  Zestril  PO  





  QDAY [facility]  


 


Ondansetron HCl  4 mg  04/08/19 11:47  04/09/19 00:36





  Zofran  IV   4 mg





  Q8H PRN   Administration





  Nausea And Vomiting  


 


Sodium Chloride  10 ml  04/08/19 22:00  04/08/19 22:24





  Sodium Chloride Flush Syringe 10 Ml  IV   10 ml





  BID MARK   Administration


 


Sodium Chloride  10 ml  04/08/19 11:47 





  Sodium Chloride Flush Syringe 10 Ml  IV  





  PRN PRN  





  LINE FLUSH

## 2019-04-09 NOTE — PROGRESS NOTE
Assessment and Plan








Impression:


* ESRD


* Uremia 


* Hypocalcemia 


* Secondary hyperparathyroidism


* Anemia in ESRD


* Hypertension


* Type II DM








PLan:


* Daily HD x 3 - 1st treatment today


* UF as tolerated - patient continues to make urine


* Start Epogen TIW prn


* Start Calcitriol 0.5mcg daily


* CM consulted for outpatient dialysis clinic placement


* Stable for d/c to home once outpatient HD arranged





Subjective


Date of service: 04/09/19


Interval history: 





Patient is s/p first HD treatment today.  She has no complaints.  States 

dialysis went well.





Objective





- Vital Signs


Vital signs: 


                               Vital Signs - 12hr











  04/09/19 04/09/19 04/09/19





  06:13 07:47 09:10


 


Temperature 98.7 F 98.3 F 98.5 F


 


Pulse Rate 69 64 61


 


Respiratory 20 17 13





Rate   


 


Blood Pressure 161/79  182/76


 


Blood Pressure  184/80 





[Left]   


 


O2 Sat by Pulse 92 95 100





Oximetry   














  04/09/19 04/09/19 04/09/19





  09:25 09:45 10:00


 


Temperature   


 


Pulse Rate 59 L 58 L 66


 


Respiratory 14 16 18





Rate   


 


Blood Pressure 157/73 157/70 170/79


 


Blood Pressure   





[Left]   


 


O2 Sat by Pulse 100 100 93





Oximetry   














  04/09/19 04/09/19 04/09/19





  10:15 10:54 11:06


 


Temperature   


 


Pulse Rate 58 L 61 59 L


 


Respiratory 14 10 L 





Rate   


 


Blood Pressure 190/90 195/86 197/89


 


Blood Pressure   





[Left]   


 


O2 Sat by Pulse 92 92 





Oximetry   














  04/09/19 04/09/19 04/09/19





  11:08 11:15 11:29


 


Temperature   


 


Pulse Rate 60 62 64


 


Respiratory 17 12 16





Rate   


 


Blood Pressure 197/89 180/77 152/58


 


Blood Pressure   





[Left]   


 


O2 Sat by Pulse 94 94 94





Oximetry   














  04/09/19 04/09/19 04/09/19





  11:57 12:30 14:00


 


Temperature  97.6 F 98.2 F


 


Pulse Rate 66 67 69


 


Respiratory 15 18 18





Rate   


 


Blood Pressure 171/74 156/75 175/88


 


Blood Pressure   





[Left]   


 


O2 Sat by Pulse 95 95 





Oximetry   














- General Appearance


General appearance: well-developed, well-nourished


EENT: ATNC


Respiratory: Present: Clear to Ascultation


Cardiology: regular, S1S2


Gastrointestinal: normal, no tenderness, no distended


Integumentary: no rash, warm and dry


Neurologic: no focal deficit


Musculoskeletal: other (no edema)


Psychiatric: cooperative





- Lab





                                 04/08/19 09:28





                                 04/08/19 09:31


                             Most recent lab results











Calcium  5.6 mg/dL (8.4-10.2)  L*  04/08/19  09:31    














Medications & Allergies





- Medications


Allergies/Adverse Reactions: 


                                    Allergies





No Known Allergies Allergy (Unverified 12/17/16 15:27)


   








Home Medications: 


                                Home Medications











 Medication  Instructions  Recorded  Confirmed  Last Taken  Type


 


Carvedilol [Coreg] 25 mg PO BID 04/08/19 04/08/19 Unknown History


 


Furosemide [Lasix TAB] 40 mg PO QDAY 04/08/19 04/08/19 Unknown History


 


Hydralazine HCl 50 mg PO TID 04/08/19 04/08/19 Unknown History


 


Insulin Glargine,Hum.rec.anlog 14 units SQ DAILY 04/08/19 04/08/19 Unknown 

History





[Lantus]     


 


Levothyroxine [Synthroid] 75 mcg PO QAM 04/08/19 04/08/19 Unknown History


 


Rosuvastatin Calcium 5 mg PO DAILY 04/08/19 04/08/19 Unknown History


 


glipiZIDE [Glipizide] 5 mg PO DAILY 04/08/19 04/08/19 Unknown History











Active Medications: 














Generic Name Dose Route Start Last Admin





  Trade Name Freq  PRN Reason Stop Dose Admin


 


Acetaminophen  650 mg  04/08/19 11:47 





  Tylenol  PO  





  Q4H PRN  





  Pain MILD(1-3)/Fever >100.5/HA  


 


Atorvastatin Calcium  10 mg  04/09/19 22:00 





  Lipitor  PO  





  QHS [facility]  


 


Calcium Carbonate/Glycine  1,000 mg  04/08/19 20:00  04/09/19 09:08





  Tums  PO   Not Given





  TID [facility]  


 


Carvedilol  25 mg  04/09/19 22:00 





  Coreg  PO  





  BID MARK  


 


Dextrose  50 ml  04/08/19 12:09  04/09/19 06:37





  D50w (25gm) Syringe  IV   50 ml





  PRN PRN   Administration





  Hypoglycemia  


 


Furosemide  40 mg  04/10/19 06:00 





  Lasix  PO  





  DAILY@0600 MARK  


 


Heparin Sodium (Porcine)  5,000 unit  04/09/19 14:00 





  Heparin  SUB-Q  





  Q8HR [facility]  


 


Hydralazine HCl  10 mg  04/08/19 12:08  04/09/19 11:06





  Apresoline  IV   10 mg





  Q4HR PRN   Administration





  HTN SYS>155  


 


Hydralazine HCl  50 mg  04/09/19 14:00 





  Apresoline  PO  





  Q8HR MARK  


 


Hydralazine HCl  10 mg  04/09/19 11:26 





  Apresoline  IV  





  Q4HR PRN  





  BP >160/100  


 


Sodium Chloride  100 mls @ 999 mls/hr  04/08/19 11:41 





  Nacl 0.9%  IV  





  MICHELLE PRN  





  Hypotension  


 


Sodium Chloride  500 mls @ 50 mls/hr  04/09/19 08:00  04/09/19 08:35





  Nacl 0.9% 500 Ml  IV   0 mls





  AS DIRECT MARK   Administration


 


Insulin Glargine  14 units  04/09/19 22:00 





  Lantus  SUB-Q  





  QHS [facility]  


 


Insulin Human Lispro  0 unit  04/09/19 11:30  04/09/19 13:00





  Humalog  SUB-Q   Not Given





  [facility]  





  Protocol  


 


Levothyroxine Sodium  75 mcg  04/10/19 06:00 





  Synthroid  PO  





  DAILY@0600 [facility]  


 


Lisinopril  40 mg  04/09/19 10:00 





  Zestril  PO  





  QDAY [facility]  


 


Nifedipine  60 mg  04/09/19 12:00 





  Procardia Xl  PO  





  Q12HR [facility]  


 


Ondansetron HCl  4 mg  04/08/19 11:47  04/09/19 00:36





  Zofran  IV   4 mg





  Q8H PRN   Administration





  Nausea And Vomiting  


 


Sodium Chloride  10 ml  04/08/19 22:00  04/08/19 22:24





  Sodium Chloride Flush Syringe 10 Ml  IV   10 ml





  BID MARK   Administration


 


Sodium Chloride  10 ml  04/08/19 11:47 





  Sodium Chloride Flush Syringe 10 Ml  IV  





  PRN PRN  





  LINE FLUSH

## 2019-04-09 NOTE — OPERATIVE REPORT
Operative Report


Operative Report: 





Date of procedure: 4/9/2019





Pre-operative diagnosis: ESRD





Post-operative diagnosis: same





Procedure name(s):





1. US guided puncture of the right internal jugular vein


2. Placement of right internal jugular permacath (Glidepath 19 cm)


3. Fluoroscopic supervision





Surgeon: Fritz Dominique MD, FACS





Assistant: none





Anesthesia: local with sedation; Sedation start: 0835   Sedation end: 0855 Total

sedation time: 20 or 2 anesthesia units





EBL: minimal





Operative indication: Patient is a 72 yo woman who requires hemodialysis access.





Findings: Good flow from both ports.  Catheter located at the cavoatrial 

junction.





Procedure: 


The patient was placed on the table in the supine position.  The area over the 

right neck and chest was prepped with ChloraPrep solution and draped in usual 

sterile fashion.  2% lidocaine was used for local anesthesia.  Under real-time 

ultrasound guidance the right internal jugular vein was identified and 

cannulated.  A guidewire was advanced into the central circulation.  A tunnel 

was made over the anterior chest using the tunneling device in the kit.  The 

catheter was then tunneled between the 2 incisions.  Using a series of dilators,

the track into the jugular vein was dilated.  The introducer sheath was then 

placed.  The catheter was placed through the introducer sheath which was then 

removed.  The catheter tip was placed at the cavoatrial junction.  The catheters

were aspirated with excellent flow from both ports.  Both ports flushed easily. 

They were then filled to their stated volume with 1000 unit per milliliter 

heparin.  Closure at the insertion site was done with 4-0 subcuticular PDS.  The

catheter was sewn to the skin with 2-0 Proline. Sterile dressings were applied. 

The patient tolerated the procedure well.

## 2019-04-10 PROCEDURE — 5A1D70Z PERFORMANCE OF URINARY FILTRATION, INTERMITTENT, LESS THAN 6 HOURS PER DAY: ICD-10-PCS | Performed by: INTERNAL MEDICINE

## 2019-04-10 RX ADMIN — HYDRALAZINE HYDROCHLORIDE SCH: 25 TABLET, FILM COATED ORAL at 22:07

## 2019-04-10 RX ADMIN — HEPARIN SODIUM SCH UNIT: 5000 INJECTION, SOLUTION INTRAVENOUS; SUBCUTANEOUS at 05:49

## 2019-04-10 RX ADMIN — NIFEDIPINE SCH MG: 60 TABLET, EXTENDED RELEASE ORAL at 22:04

## 2019-04-10 RX ADMIN — CALCIUM CARBONATE (ANTACID) CHEW TAB 500 MG SCH: 500 CHEW TAB at 14:46

## 2019-04-10 RX ADMIN — FUROSEMIDE SCH MG: 40 TABLET ORAL at 05:50

## 2019-04-10 RX ADMIN — Medication SCH ML: at 22:06

## 2019-04-10 RX ADMIN — CALCIUM CARBONATE (ANTACID) CHEW TAB 500 MG SCH MG: 500 CHEW TAB at 22:04

## 2019-04-10 RX ADMIN — HEPARIN SODIUM SCH UNIT: 5000 INJECTION, SOLUTION INTRAVENOUS; SUBCUTANEOUS at 22:05

## 2019-04-10 RX ADMIN — CALCIUM CARBONATE (ANTACID) CHEW TAB 500 MG SCH MG: 500 CHEW TAB at 07:45

## 2019-04-10 RX ADMIN — HEPARIN SODIUM SCH UNIT: 5000 INJECTION, SOLUTION INTRAVENOUS; SUBCUTANEOUS at 14:09

## 2019-04-10 RX ADMIN — Medication SCH ML: at 11:03

## 2019-04-10 RX ADMIN — HYDRALAZINE HYDROCHLORIDE SCH: 25 TABLET, FILM COATED ORAL at 05:51

## 2019-04-10 RX ADMIN — ONDANSETRON PRN MG: 2 INJECTION INTRAMUSCULAR; INTRAVENOUS at 07:48

## 2019-04-10 RX ADMIN — LEVOTHYROXINE SODIUM SCH MCG: 0.07 TABLET ORAL at 05:50

## 2019-04-10 RX ADMIN — NIFEDIPINE SCH MG: 60 TABLET, EXTENDED RELEASE ORAL at 11:03

## 2019-04-10 RX ADMIN — CARVEDILOL SCH MG: 25 TABLET, FILM COATED ORAL at 11:03

## 2019-04-10 RX ADMIN — HYDRALAZINE HYDROCHLORIDE SCH: 25 TABLET, FILM COATED ORAL at 14:15

## 2019-04-10 RX ADMIN — CALCITRIOL SCH MCG: 0.5 CAPSULE, LIQUID FILLED ORAL at 11:02

## 2019-04-10 RX ADMIN — CARVEDILOL SCH MG: 25 TABLET, FILM COATED ORAL at 22:04

## 2019-04-10 RX ADMIN — INSULIN GLARGINE SCH UNITS: 100 INJECTION, SOLUTION SUBCUTANEOUS at 22:06

## 2019-04-10 RX ADMIN — LISINOPRIL SCH MG: 40 TABLET ORAL at 11:02

## 2019-04-10 NOTE — PROGRESS NOTE
Assessment and Plan





Impression:


* ESRD


* Uremia 


* Hypocalcemia 


* Secondary hyperparathyroidism


* Anemia in ESRD


* Hypertension


* Type II DM








PLan:


* Daily HD x 3 - 2cd treatment today


* UF as tolerated - patient continues to make urine


* Epogen TIW prn


* Calcitriol 0.5mcg daily


* CM consulted for outpatient dialysis clinic placement


* Stable for d/c to home once outpatient HD arranged





Subjective


Date of service: 04/10/19


Principal diagnosis: esrd


Interval history: 





resting in bed today





Objective





- Exam


Narrative Exam: 





General appearance: Present: no acute distress





- EENT


Eyes: Present: PERRL


ENT: hearing intact, clear oral mucosa





- Neck


Neck: Present: supple, normal ROM





- Respiratory


Respiratory effort: normal


Respiratory: bilateral: CTA





- Cardiovascular


Heart Sounds: Present: S1 & S2.  Absent: rub, click





- Extremities


Extremities: pulses symmetrical, No edema


Peripheral Pulses: within normal limits





- Abdominal


General gastrointestinal: Present: soft, non-tender, non-distended, normal bowel

sounds


Female genitourinary: Present: normal





- Integumentary


Integumentary: Present: clear, warm, dry





- Musculoskeletal


Musculoskeletal: gait normal, strength equal bilaterally





- Psychiatric


Psychiatric: appropriate mood/affect, intact judgment & insight





- Neurologic


Neurologic: CNII-XII intact, moves all extremities





- Vital Signs


Vital signs: 


                               Vital Signs - 12hr











  04/10/19 04/10/19 04/10/19





  02:08 05:51 08:00


 


Temperature 98.6 F  98.2 F


 


Pulse Rate 68 76 71


 


Respiratory 20  16





Rate   


 


Blood Pressure 106/43 106/43 128/64


 


O2 Sat by Pulse 91  





Oximetry   














  04/10/19 04/10/19 04/10/19





  08:15 08:30 08:45


 


Temperature   


 


Pulse Rate 71 68 70


 


Respiratory   





Rate   


 


Blood Pressure 128/64 146/72 140/70


 


O2 Sat by Pulse   





Oximetry   














  04/10/19 04/10/19 04/10/19





  09:00 09:15 09:30


 


Temperature   


 


Pulse Rate 71 64 69


 


Respiratory   





Rate   


 


Blood Pressure 137/72 126/54 129/65


 


O2 Sat by Pulse   





Oximetry   














  04/10/19 04/10/19 04/10/19





  09:45 10:00 10:15


 


Temperature   


 


Pulse Rate 69 70 68


 


Respiratory   





Rate   


 


Blood Pressure 121/68 147/71 113/68


 


O2 Sat by Pulse   





Oximetry   














- Lab





                                 04/08/19 09:28





                                 04/08/19 09:31


                             Most recent lab results











Calcium  5.6 mg/dL (8.4-10.2)  L*  04/08/19  09:31    














Medications & Allergies





- Medications


Allergies/Adverse Reactions: 


                                    Allergies





No Known Allergies Allergy (Unverified 12/17/16 15:27)


   








Home Medications: 


                                Home Medications











 Medication  Instructions  Recorded  Confirmed  Last Taken  Type


 


Carvedilol [Coreg] 25 mg PO BID 04/08/19 04/08/19 Unknown History


 


Furosemide [Lasix TAB] 40 mg PO QDAY 04/08/19 04/08/19 Unknown History


 


Hydralazine HCl 50 mg PO TID 04/08/19 04/08/19 Unknown History


 


Insulin Glargine,Hum.rec.anlog 14 units SQ DAILY 04/08/19 04/08/19 Unknown 

History





[Lantus]     


 


Levothyroxine [Synthroid] 75 mcg PO QAM 04/08/19 04/08/19 Unknown History


 


Rosuvastatin Calcium 5 mg PO DAILY 04/08/19 04/08/19 Unknown History


 


glipiZIDE [Glipizide] 5 mg PO DAILY 04/08/19 04/08/19 Unknown History











Active Medications: 














Generic Name Dose Route Start Last Admin





  Trade Name Freq  PRN Reason Stop Dose Admin


 


Acetaminophen  650 mg  04/08/19 11:47  04/09/19 19:59





  Tylenol  PO   650 mg





  Q4H PRN   Administration





  Pain MILD(1-3)/Fever >100.5/HA  


 


Atorvastatin Calcium  10 mg  04/09/19 22:00  04/09/19 21:22





  Lipitor  PO   10 mg





  QHS MARK   Administration


 


Calcitriol  0.5 mcg  04/09/19 19:00  04/09/19 21:22





  Rocaltrol  PO   0.5 mcg





  QDAY MARK   Administration


 


Calcium Carbonate/Glycine  1,000 mg  04/08/19 20:00  04/10/19 07:45





  Tums  PO   1,000 mg





  TID MARK   Administration


 


Carvedilol  25 mg  04/09/19 22:00  04/09/19 21:23





  Coreg  PO   25 mg





  BID MARK   Administration


 


Dextrose  50 ml  04/08/19 12:09  04/09/19 06:37





  D50w (25gm) Syringe  IV   50 ml





  PRN PRN   Administration





  Hypoglycemia  


 


Epoetin Ashish  10,000 unit  04/09/19 18:44 





  Procrit  IV  





  MICHELLE PRN  





  hemodialysis  


 


Furosemide  40 mg  04/10/19 06:00  04/10/19 05:50





  Lasix  PO   40 mg





  DAILY@0600 MARK   Administration


 


Heparin Sodium (Porcine)  5,000 unit  04/09/19 14:00  04/10/19 05:49





  Heparin  SUB-Q   5,000 unit





  Q8HR MARK   Administration


 


Heparin Sodium (Porcine)  5,000 unit  04/09/19 18:44 





  Heparin  IV  





  MICHELLE PRN  





  hemodialysis  


 


Hydralazine HCl  10 mg  04/08/19 12:08  04/09/19 11:06





  Apresoline  IV   10 mg





  Q4HR PRN   Administration





  HTN SYS>155  


 


Hydralazine HCl  50 mg  04/09/19 14:00  04/10/19 05:51





  Apresoline  PO   Not Given





  Q8HR MARK  


 


Hydralazine HCl  10 mg  04/09/19 11:26 





  Apresoline  IV  





  Q4HR PRN  





  BP >160/100  


 


Sodium Chloride  100 mls @ 999 mls/hr  04/08/19 11:41 





  Nacl 0.9%  IV  





  MICHELLE PRN  





  Hypotension  


 


Sodium Chloride  500 mls @ 50 mls/hr  04/09/19 08:00  04/09/19 08:35





  Nacl 0.9% 500 Ml  IV   0 mls





  AS DIRECT MARK   Administration


 


Sodium Chloride  100 mls @ 999 mls/hr  04/09/19 18:44 





  Nacl 0.9%  IV  





  MICHELLE PRN  





  Hypotension  


 


Insulin Glargine  14 units  04/09/19 22:00  04/09/19 21:23





  Lantus  SUB-Q   14 units





  QHS MARK   Administration


 


Insulin Human Lispro  0 unit  04/09/19 11:30  04/10/19 07:20





  Humalog  SUB-Q   Not Given





  [facility]  





  Protocol  


 


Levothyroxine Sodium  75 mcg  04/10/19 06:00  04/10/19 05:50





  Synthroid  PO   75 mcg





  DAILY@0600 MARK   Administration


 


Lisinopril  40 mg  04/09/19 10:00  04/09/19 16:56





  Zestril  PO   40 mg





  QDAY MARK   Administration


 


Nifedipine  60 mg  04/09/19 12:00  04/09/19 21:22





  Procardia Xl  PO   60 mg





  Q12HR MARK   Administration


 


Ondansetron HCl  4 mg  04/08/19 11:47  04/10/19 07:48





  Zofran  IV   4 mg





  Q8H PRN   Administration





  Nausea And Vomiting  


 


Sodium Chloride  10 ml  04/08/19 22:00  04/09/19 21:25





  Sodium Chloride Flush Syringe 10 Ml  IV   10 ml





  BID MARK   Administration


 


Sodium Chloride  10 ml  04/08/19 11:47 





  Sodium Chloride Flush Syringe 10 Ml  IV  





  PRN PRN  





  LINE FLUSH

## 2019-04-10 NOTE — PROGRESS NOTE
Assessment and Plan


Assessment and plan: 





73F w pmh of ESRD who was previously refusing dialysis, presents with worsening 

Uremia. she is planning for kidney transplant





pmh; dm, esrd, htn, hypothyroidism





Dx


esrd


uremia


htn urgency


AOCD


hyperkalemia


Severe malnutrition


DM





Plan


sp PC, cont HD


hg, stable, epo per neprhology


optimize bp meds


dietician consult


cont SSI, restarted home lantus, a1c 7


dvt ppx- heparin





awaiting HD placement





History


Interval history: 





Review of systems


Constitutional: No fevers, no malaise, no joint pains





CVS: No chest pain, no orthopnea, no dyspnea on exertion, no pedal edema





GI: No abdominal pain, no diarrhea, no vomiting, no constipation





Respiratory: No shortness of breath, no wheezing, no coughing





Hospitalist Physical





- Physical exam


Narrative exam: 





General.: Appears well, no distress, nontoxic


HEENT: Moist mucous membranes, extraocular muscles intact, no lymphadenopathy


Neck: supple


Cardiac: S1-S2 heard


Lungs: clear to auscultation bilaterally


Abdomen: soft , nontender,  nondistended,  bowel sounds positive


Extremities: no edema clubbing or cyanosis


Skin: no rash or lesions


Neurologic: no gross focal deficits


Psych: calm, and cooperative





- Constitutional


Vitals: 


                                        











Temp Pulse Resp BP Pulse Ox


 


 98.3 F   72   16   154/74   91 


 


 04/10/19 13:02  04/10/19 13:02  04/10/19 13:02  04/10/19 13:02  04/10/19 02:08











General appearance: Present: no acute distress





Results





- Labs


CBC & Chem 7: 


                                 04/08/19 09:28





                                 04/08/19 09:31


Labs: 


                             Laboratory Last Values











WBC  4.9 K/mm3 (4.5-11.0)   04/08/19  09:28    


 


RBC  3.09 M/mm3 (3.65-5.03)  L  04/08/19  09:28    


 


Hgb  9.0 gm/dl (10.1-14.3)  L  04/08/19  09:28    


 


Hct  27.9 % (30.3-42.9)  L  04/08/19  09:28    


 


MCV  90 fl (79-97)   04/08/19  09:28    


 


MCH  29 pg (28-32)   04/08/19  09:28    


 


MCHC  32 % (30-34)   04/08/19  09:28    


 


RDW  16.0 % (13.2-15.2)  H  04/08/19  09:28    


 


Plt Count  345 K/mm3 (140-440)   04/08/19  09:28    


 


Lymph % (Auto)  13.8 % (13.4-35.0)   04/08/19  09:28    


 


Mono % (Auto)  7.3 % (0.0-7.3)   04/08/19  09:28    


 


Eos % (Auto)  2.1 % (0.0-4.3)   04/08/19  09:28    


 


Baso % (Auto)  1.5 % (0.0-1.8)   04/08/19  09:28    


 


Lymph #  0.7 K/mm3 (1.2-5.4)  L  04/08/19  09:28    


 


Mono #  0.4 K/mm3 (0.0-0.8)   04/08/19  09:28    


 


Eos #  0.1 K/mm3 (0.0-0.4)   04/08/19  09:28    


 


Baso #  0.1 K/mm3 (0.0-0.1)   04/08/19  09:28    


 


Seg Neutrophils %  75.3 % (40.0-70.0)  H  04/08/19  09:28    


 


Seg Neutrophils #  3.7 K/mm3 (1.8-7.7)   04/08/19  09:28    


 


PT  13.3 Sec. (12.2-14.9)   04/08/19  11:55    


 


INR  0.95  (0.87-1.13)   04/08/19  11:55    


 


Sodium  139 mmol/L (137-145)   04/08/19  09:31    


 


Potassium  5.2 mmol/L (3.6-5.0)  H  04/08/19  09:31    


 


Chloride  106.7 mmol/L ()   04/08/19  09:31    


 


Carbon Dioxide  15 mmol/L (22-30)  L  04/08/19  09:31    


 


Anion Gap  23 mmol/L  04/08/19  09:31    


 


BUN  103 mg/dL (7-17)  H  04/08/19  09:31    


 


Creatinine  12.6 mg/dL (0.7-1.2)  H  04/08/19  09:31    


 


Estimated GFR  3 ml/min  04/08/19  09:31    


 


BUN/Creatinine Ratio  8 %  04/08/19  09:31    


 


Glucose  161 mg/dL ()  H  04/08/19  09:31    


 


POC Glucose  100  ()   04/10/19  11:28    


 


Hemoglobin A1c  7.0 % (4-6)  H  04/10/19  07:19    


 


Calcium  5.6 mg/dL (8.4-10.2)  L*  04/08/19  09:31    


 


Total Bilirubin  0.20 mg/dL (0.1-1.2)   04/08/19  09:31    


 


AST  37 units/L (5-40)   04/08/19  09:31    


 


ALT  103 units/L (7-56)  H  04/08/19  09:31    


 


Alkaline Phosphatase  123 units/L ()   04/08/19  09:31    


 


Total Protein  5.6 g/dL (6.3-8.2)  L  04/08/19  09:31    


 


Albumin  2.8 g/dL (3.9-5)  L  04/08/19  09:31    


 


Albumin/Globulin Ratio  1.0 %  04/08/19  09:31    


 


PTH Intact  268.2 pg/mL (15-65)  H  04/08/19  16:11    


 


Hepatitis A IgM Ab  Non-reactive  (NonReactive)   04/08/19  16:11    


 


Hep Bs Antigen  Non-reactive  (Negative)   04/08/19  16:11    


 


Hep B Core IgM Ab  Non-reactive  (NonReactive)   04/08/19  16:11    


 


Hepatitis C Antibody  Non-reactive  (NonReactive)   04/08/19  16:11    














Active Medications





- Current Medications


Current Medications: 














Generic Name Dose Route Start Last Admin





  Trade Name Freq  PRN Reason Stop Dose Admin


 


Acetaminophen  650 mg  04/08/19 11:47  04/09/19 19:59





  Tylenol  PO   650 mg





  Q4H PRN   Administration





  Pain MILD(1-3)/Fever >100.5/HA  


 


Atorvastatin Calcium  10 mg  04/09/19 22:00  04/09/19 21:22





  Lipitor  PO   10 mg





  QHS MARK   Administration


 


Calcitriol  0.5 mcg  04/09/19 19:00  04/10/19 11:02





  Rocaltrol  PO   0.5 mcg





  QDAY MARK   Administration


 


Calcium Carbonate/Glycine  1,000 mg  04/08/19 20:00  04/10/19 07:45





  Tums  PO   1,000 mg





  TID MARK   Administration


 


Carvedilol  25 mg  04/09/19 22:00  04/10/19 11:03





  Coreg  PO   25 mg





  BID MARK   Administration


 


Dextrose  50 ml  04/08/19 12:09  04/09/19 06:37





  D50w (25gm) Syringe  IV   50 ml





  PRN PRN   Administration





  Hypoglycemia  


 


Epoetin Ashish  10,000 unit  04/09/19 18:44 





  Procrit  IV  





  MICHELLE PRN  





  hemodialysis  


 


Furosemide  40 mg  04/10/19 06:00  04/10/19 05:50





  Lasix  PO   40 mg





  DAILY@0600 MARK   Administration


 


Heparin Sodium (Porcine)  5,000 unit  04/09/19 14:00  04/10/19 05:49





  Heparin  SUB-Q   5,000 unit





  Q8HR MARK   Administration


 


Heparin Sodium (Porcine)  5,000 unit  04/09/19 18:44 





  Heparin  IV  





  MICHELLE PRN  





  hemodialysis  


 


Hydralazine HCl  50 mg  04/09/19 14:00  04/10/19 05:51





  Apresoline  PO   Not Given





  Q8HR MARK  


 


Hydralazine HCl  10 mg  04/09/19 11:26 





  Apresoline  IV  





  Q4HR PRN  





  BP >160/100  


 


Sodium Chloride  500 mls @ 50 mls/hr  04/09/19 08:00  04/09/19 08:35





  Nacl 0.9% 500 Ml  IV   0 mls





  AS DIRECT MARK   Administration


 


Sodium Chloride  100 mls @ 999 mls/hr  04/09/19 18:44 





  Nacl 0.9%  IV  





  MICHELLE PRN  





  Hypotension  


 


Insulin Glargine  14 units  04/09/19 22:00  04/09/19 21:23





  Lantus  SUB-Q   14 units





  QHS MARK   Administration


 


Insulin Human Lispro  0 unit  04/09/19 11:30  04/10/19 11:31





  Humalog  SUB-Q   Not Given





  [facility]  





  Protocol  


 


Levothyroxine Sodium  75 mcg  04/10/19 06:00  04/10/19 05:50





  Synthroid  PO   75 mcg





  DAILY@0600 MARK   Administration


 


Lisinopril  40 mg  04/09/19 10:00  04/10/19 11:02





  Zestril  PO   40 mg





  QDAY MARK   Administration


 


Nifedipine  60 mg  04/09/19 12:00  04/10/19 11:03





  Procardia Xl  PO   60 mg





  Q12HR MARK   Administration


 


Ondansetron HCl  4 mg  04/08/19 11:47  04/10/19 07:48





  Zofran  IV   4 mg





  Q8H PRN   Administration





  Nausea And Vomiting  


 


Sodium Chloride  10 ml  04/08/19 22:00  04/10/19 11:03





  Sodium Chloride Flush Syringe 10 Ml  IV   10 ml





  BID MARK   Administration


 


Sodium Chloride  10 ml  04/08/19 11:47 





  Sodium Chloride Flush Syringe 10 Ml  IV  





  PRN PRN  





  LINE FLUSH  














Nutrition/Malnutrition Assess





- Dietary Evaluation


Nutrition/Malnutrition Findings: 


                                        





Nutrition Notes                                            Start:  04/09/19 

14:26


Freq:                                                      Status: Active       




Protocol:                                                                       




 Document     04/09/19 14:26  EB  (Rec: 04/09/19 14:28  EB  [id number])


 Co-Sign      04/09/19 14:26  LP


 Nutrition Notes


     Need for Assessment generated from:         MD Order


     Initial or Follow up                        Brief Note


     Height                                      5 ft 2 in


     Weight                                      60.781 kg


     Ideal Body Weight (kg)                      50.00


     BMI                                         24.5


     Subjective/Other Information                Consulted for Malnutrition.


                                                 Pt in dialysis at this time.


                                                 Recorded PO intake 100% x 1


                                                 meal.


 Nutrition Intervention


     Follow-Up By:                               04/10/19


     Additional Comments                         F/u: Malnutrition assessment

## 2019-04-11 VITALS — SYSTOLIC BLOOD PRESSURE: 110 MMHG | DIASTOLIC BLOOD PRESSURE: 44 MMHG

## 2019-04-11 PROCEDURE — 5A1D70Z PERFORMANCE OF URINARY FILTRATION, INTERMITTENT, LESS THAN 6 HOURS PER DAY: ICD-10-PCS | Performed by: INTERNAL MEDICINE

## 2019-04-11 RX ADMIN — HEPARIN SODIUM SCH UNIT: 5000 INJECTION, SOLUTION INTRAVENOUS; SUBCUTANEOUS at 05:33

## 2019-04-11 RX ADMIN — HYDRALAZINE HYDROCHLORIDE SCH: 25 TABLET, FILM COATED ORAL at 13:44

## 2019-04-11 RX ADMIN — LISINOPRIL SCH MG: 40 TABLET ORAL at 12:31

## 2019-04-11 RX ADMIN — CALCIUM CARBONATE (ANTACID) CHEW TAB 500 MG SCH MG: 500 CHEW TAB at 13:55

## 2019-04-11 RX ADMIN — HYDRALAZINE HYDROCHLORIDE SCH: 25 TABLET, FILM COATED ORAL at 13:41

## 2019-04-11 RX ADMIN — HEPARIN SODIUM SCH UNIT: 5000 INJECTION, SOLUTION INTRAVENOUS; SUBCUTANEOUS at 13:55

## 2019-04-11 RX ADMIN — Medication SCH ML: at 12:32

## 2019-04-11 RX ADMIN — CALCIUM CARBONATE (ANTACID) CHEW TAB 500 MG SCH MG: 500 CHEW TAB at 07:30

## 2019-04-11 RX ADMIN — LEVOTHYROXINE SODIUM SCH MCG: 0.07 TABLET ORAL at 05:33

## 2019-04-11 RX ADMIN — HYDRALAZINE HYDROCHLORIDE SCH: 25 TABLET, FILM COATED ORAL at 05:34

## 2019-04-11 RX ADMIN — CARVEDILOL SCH MG: 25 TABLET, FILM COATED ORAL at 12:31

## 2019-04-11 RX ADMIN — NIFEDIPINE SCH MG: 60 TABLET, EXTENDED RELEASE ORAL at 12:30

## 2019-04-11 RX ADMIN — CALCITRIOL SCH MCG: 0.5 CAPSULE, LIQUID FILLED ORAL at 12:32

## 2019-04-11 RX ADMIN — FUROSEMIDE SCH MG: 40 TABLET ORAL at 05:33

## 2019-04-11 NOTE — PROGRESS NOTE
<REBA AKHTAR - Last Filed: 04/11/19 14:34>





Assessment and Plan


Assessment and plan: 





73 year old female with history of ESRD who previously refused hemodialysis 

treatment d/t wanting undergo a renal transplant presented to the ED with 

complaints of nausea, fatigue, generalized weakness and overall not feeling well

for the past month with symptoms worsening over the past week.  She was found to

have ESRD, acidosis, hyperkalemia with no evidence of ECG changes, and anemia of

chronic disease subsequently admitted on 4/8.  She underwent PermCath placement 

on 4/10.  At the time of my examination patient is currently receiving 

hemodialysis.  She has no complaints at this time she is pending outpatient  

hemo-dialysis clinic placement with the [facility] per case management note 

on 4/10.








ESRD


Being managed by nephrology


Currently receiving hemodialysis


Has received hemodialysis on 4/9 and 4/10


Pending outpatient hemodialysis clinic placement


Patient plans to pursue renal transplant [facility]








Chronic anemia


Epogen prn





Hypertension


Continue to monitor BP


Continue antihypertensive medication





Hypocalcemia


On calcitriol.5mcg BID





Diabetes mellitus 


Continue POC BG monitoring, SSI and scheduled coverage 














Hospitalist Physical





- Constitutional


Vitals: 


                                        











Temp Pulse Resp BP Pulse Ox


 


 93.8 F L  63   16   140/68   91 


 


 04/11/19 08:30  04/11/19 11:50  04/11/19 08:30  04/11/19 11:50  04/11/19 07:16











General appearance: Present: no acute distress





- EENT


Eyes: Present: PERRL, EOM intact





- Neck


Neck: Present: supple, normal ROM





- Respiratory


Respiratory effort: normal


Respiratory: bilateral: diminished (bases)





- Cardiovascular


Rhythm: regular


Heart Sounds: Present: S1 & S2





- Extremities


Extremities: pulses intact, pulses symmetrical


Extremity abnormal: edema





- Peripheral Assessment


  ** Generalized


Edema Type: Non-pitting


Edema Degree: Trace


Capillary Refill: < 3 seconds


Skin Temperature: Warm


Peripheral Pulses: within normal limits





- Abdominal


General gastrointestinal: soft, non-tender





- Integumentary


Integumentary: Present: warm, dry





- Psychiatric


Psychiatric: appropriate mood/affect, cooperative





Results





- Labs


CBC & Chem 7: 


                                 04/08/19 09:28





                                 04/08/19 09:31


Labs: 


                             Laboratory Last Values











WBC  4.9 K/mm3 (4.5-11.0)   04/08/19  09:28    


 


RBC  3.09 M/mm3 (3.65-5.03)  L  04/08/19  09:28    


 


Hgb  9.0 gm/dl (10.1-14.3)  L  04/08/19  09:28    


 


Hct  27.9 % (30.3-42.9)  L  04/08/19  09:28    


 


MCV  90 fl (79-97)   04/08/19  09:28    


 


MCH  29 pg (28-32)   04/08/19  09:28    


 


MCHC  32 % (30-34)   04/08/19  09:28    


 


RDW  16.0 % (13.2-15.2)  H  04/08/19  09:28    


 


Plt Count  345 K/mm3 (140-440)   04/08/19  09:28    


 


Lymph % (Auto)  13.8 % (13.4-35.0)   04/08/19  09:28    


 


Mono % (Auto)  7.3 % (0.0-7.3)   04/08/19  09:28    


 


Eos % (Auto)  2.1 % (0.0-4.3)   04/08/19  09:28    


 


Baso % (Auto)  1.5 % (0.0-1.8)   04/08/19  09:28    


 


Lymph #  0.7 K/mm3 (1.2-5.4)  L  04/08/19  09:28    


 


Mono #  0.4 K/mm3 (0.0-0.8)   04/08/19  09:28    


 


Eos #  0.1 K/mm3 (0.0-0.4)   04/08/19  09:28    


 


Baso #  0.1 K/mm3 (0.0-0.1)   04/08/19  09:28    


 


Seg Neutrophils %  75.3 % (40.0-70.0)  H  04/08/19  09:28    


 


Seg Neutrophils #  3.7 K/mm3 (1.8-7.7)   04/08/19  09:28    


 


PT  13.3 Sec. (12.2-14.9)   04/08/19  11:55    


 


INR  0.95  (0.87-1.13)   04/08/19  11:55    


 


Sodium  139 mmol/L (137-145)   04/08/19  09:31    


 


Potassium  5.2 mmol/L (3.6-5.0)  H  04/08/19  09:31    


 


Chloride  106.7 mmol/L ()   04/08/19  09:31    


 


Carbon Dioxide  15 mmol/L (22-30)  L  04/08/19  09:31    


 


Anion Gap  23 mmol/L  04/08/19  09:31    


 


BUN  103 mg/dL (7-17)  H  04/08/19  09:31    


 


Creatinine  12.6 mg/dL (0.7-1.2)  H  04/08/19  09:31    


 


Estimated GFR  3 ml/min  04/08/19  09:31    


 


BUN/Creatinine Ratio  8 %  04/08/19  09:31    


 


Glucose  161 mg/dL ()  H  04/08/19  09:31    


 


POC Glucose  85  ()   04/11/19  07:08    


 


Hemoglobin A1c  7.0 % (4-6)  H  04/10/19  07:19    


 


Calcium  5.6 mg/dL (8.4-10.2)  L*  04/08/19  09:31    


 


Total Bilirubin  0.20 mg/dL (0.1-1.2)   04/08/19  09:31    


 


AST  37 units/L (5-40)   04/08/19  09:31    


 


ALT  103 units/L (7-56)  H  04/08/19  09:31    


 


Alkaline Phosphatase  123 units/L ()   04/08/19  09:31    


 


Total Protein  5.6 g/dL (6.3-8.2)  L  04/08/19  09:31    


 


Albumin  2.8 g/dL (3.9-5)  L  04/08/19  09:31    


 


Albumin/Globulin Ratio  1.0 %  04/08/19  09:31    


 


PTH Intact  268.2 pg/mL (15-65)  H  04/08/19  16:11    


 


Hepatitis A IgM Ab  Non-reactive  (NonReactive)   04/08/19  16:11    


 


Hep Bs Antigen  Non-reactive  (Negative)   04/08/19  16:11    


 


Hep B Core IgM Ab  Non-reactive  (NonReactive)   04/08/19  16:11    


 


Hepatitis C Antibody  Non-reactive  (NonReactive)   04/08/19  16:11    














Active Medications





- Current Medications


Current Medications: 














Generic Name Dose Route Start Last Admin





  Trade Name Freq  PRN Reason Stop Dose Admin


 


Acetaminophen  650 mg  04/08/19 11:47  04/09/19 19:59





  Tylenol  PO   650 mg





  Q4H PRN   Administration





  Pain MILD(1-3)/Fever >100.5/HA  


 


Atorvastatin Calcium  10 mg  04/09/19 22:00  04/10/19 22:03





  Lipitor  PO   10 mg





  QHS MARK   Administration


 


Calcitriol  0.5 mcg  04/09/19 19:00  04/10/19 11:02





  Rocaltrol  PO   0.5 mcg





  QDAY MARK   Administration


 


Calcium Carbonate/Glycine  1,000 mg  04/08/19 20:00  04/11/19 07:30





  Tums  PO   1,000 mg





  TID MARK   Administration


 


Carvedilol  25 mg  04/09/19 22:00  04/10/19 22:04





  Coreg  PO   25 mg





  BID MARK   Administration


 


Dextrose  50 ml  04/08/19 12:09  04/09/19 06:37





  D50w (25gm) Syringe  IV   50 ml





  PRN PRN   Administration





  Hypoglycemia  


 


Epoetin Ashish  10,000 unit  04/09/19 18:44  04/11/19 10:43





  Procrit  IV   10,000 unit





  MICHELLE PRN   Administration





  hemodialysis  


 


Furosemide  40 mg  04/10/19 06:00  04/11/19 05:33





  Lasix  PO   40 mg





  DAILY@0600 MARK   Administration


 


Heparin Sodium (Porcine)  5,000 unit  04/09/19 14:00  04/11/19 05:33





  Heparin  SUB-Q   5,000 unit





  Q8HR MARK   Administration


 


Heparin Sodium (Porcine)  5,000 unit  04/09/19 18:44 





  Heparin  IV  





  MICHELLE PRN  





  hemodialysis  


 


Hydralazine HCl  50 mg  04/09/19 14:00  04/11/19 05:34





  Apresoline  PO   Not Given





  Q8HR [facility]  


 


Hydralazine HCl  10 mg  04/09/19 11:26 





  Apresoline  IV  





  Q4HR PRN  





  BP >160/100  


 


Sodium Chloride  500 mls @ 50 mls/hr  04/09/19 08:00  04/09/19 08:35





  Nacl 0.9% 500 Ml  IV   0 mls





  AS DIRECT MARK   Administration


 


Sodium Chloride  100 mls @ 999 mls/hr  04/09/19 18:44 





  Nacl 0.9%  IV  





  MICHELLE PRN  





  Hypotension  


 


Insulin Glargine  14 units  04/09/19 22:00  04/10/19 22:06





  Lantus  SUB-Q   14 units





  QHS MARK   Administration


 


Insulin Human Lispro  0 unit  04/09/19 11:30  04/11/19 07:11





  Humalog  SUB-Q   Not Given





  ACHS [facility]  





  Protocol  


 


Levothyroxine Sodium  75 mcg  04/10/19 06:00  04/11/19 05:33





  Synthroid  PO   75 mcg





  DAILY@0600 MARK   Administration


 


Lisinopril  40 mg  04/09/19 10:00  04/10/19 11:02





  Zestril  PO   40 mg





  QDAY MARK   Administration


 


Nifedipine  60 mg  04/09/19 12:00  04/10/19 22:04





  Procardia Xl  PO   60 mg





  Q12HR MARK   Administration


 


Ondansetron HCl  4 mg  04/08/19 11:47  04/10/19 07:48





  Zofran  IV   4 mg





  Q8H PRN   Administration





  Nausea And Vomiting  


 


Sodium Chloride  10 ml  04/08/19 22:00  04/10/19 22:06





  Sodium Chloride Flush Syringe 10 Ml  IV   10 ml





  BID MARK   Administration


 


Sodium Chloride  10 ml  04/08/19 11:47 





  Sodium Chloride Flush Syringe 10 Ml  IV  





  PRN PRN  





  LINE FLUSH  














Nutrition/Malnutrition Assess





- Dietary Evaluation


Nutrition/Malnutrition Findings: 


                                        





Nutrition Notes                                            Start:  04/09/19 

14:26


Freq:                                                      Status: Active       




Protocol:                                                                       




 Document     04/10/19 13:44  EB  (Rec: 04/10/19 13:53  EB  [id number])


 Co-Sign      04/10/19 13:44  LP


 Nutrition Notes


     Initial or Follow up                        Assessment


     Current Diagnosis                           CKD (stage V CKD),Diabetes,


                                                 Hypertension


     Other Pertinent Diagnosis                   on HD, Anemia


     Current Diet                                Consistent CHO with Renal


                                                 Modification


     Labs/Tests                                  no current labs available


     Pertinent Medications                       Lasix


     Height                                      5 ft 2 in


     Weight                                      60.78 kg


     Ideal Body Weight (kg)                      50.00


     BMI                                         24.5


     Subjective/Other Information                Pt just returned from HD at


                                                 time of visit.  and son


                                                 also present in room and all


                                                 three had questions about diet


                                                 now that pt is receiving HD.


                                                 I gave education regarding


                                                 ESRD on HD combined with T2DM.


                                                 Pt continues eating >75%


                                                 meals.


     Percent of energy/protein needs met:        100%/92% cale and pro needs


     Burn                                        Absent


     Trauma                                      Absent


     Current % PO                                Good (%)


     #1


      Nutrition Diagnosis                        Food and nutrition-related


                                                 knowledge deficit


      Etiology                                   lack of education


      As Evidenced by Signs and Symptoms         no prior knowledge of need for


                                                 nutrition recommendations


     Is patient on ventilator?                   No


     Is Patient Ambulatory and/or Out of Bed     Yes


     REE-(Rutherford-St. Jeor-ambulatory/OOB) [     3578.863


      [email or web address]]                               


     Kcal/Kg value to use for calculation        25


     Approximate Energy Requirements Using       1520


      kcal/Kg                                    


     Additional Notes                            PRO: 1.2-1.3 g/kg (73-80 g/day


                                                 )


                                                 Fluid: 1500 mL or per MD


                                                 recommendation


 Nutrition Intervention


     Change Diet Order:                          Continue current diet


     Goal #1                                     adhere to diet recommendations


     Anticipated Discharge Needs:                Consistent CHO/Renal diet


     Revisit per MD consult or patient           Sign Off


      request:                                   











<BRIGID GARCIA M - Last Filed: 04/12/19 17:51>





Assessment and Plan


Assessment and plan: 





I saw and evaluated the patient. I agree with the findings and the plan of care 

as documented in the Nurse Practitioner's~note, with the following corrections 

and additions.


-awaiting HD placement





History


Interval history: 





no cp, no sob, no vomiting, no fever, no seizure





Hospitalist Physical





- Constitutional


Vitals: 


                                        











Temp Pulse Resp BP Pulse Ox


 


 98.1 F   72   18   110/44   91 


 


 04/11/19 13:10  04/11/19 13:10  04/11/19 13:10  04/11/19 13:10  04/11/19 13:10














Results





- Labs


CBC & Chem 7: 


                                 04/08/19 09:28





                                 04/08/19 09:31


Labs: 


                             Laboratory Last Values











WBC  4.9 K/mm3 (4.5-11.0)   04/08/19  09:28    


 


RBC  3.09 M/mm3 (3.65-5.03)  L  04/08/19  09:28    


 


Hgb  9.0 gm/dl (10.1-14.3)  L  04/08/19  09:28    


 


Hct  27.9 % (30.3-42.9)  L  04/08/19  09:28    


 


MCV  90 fl (79-97)   04/08/19  09:28    


 


MCH  29 pg (28-32)   04/08/19  09:28    


 


MCHC  32 % (30-34)   04/08/19  09:28    


 


RDW  16.0 % (13.2-15.2)  H  04/08/19  09:28    


 


Plt Count  345 K/mm3 (140-440)   04/08/19  09:28    


 


Lymph % (Auto)  13.8 % (13.4-35.0)   04/08/19  09:28    


 


Mono % (Auto)  7.3 % (0.0-7.3)   04/08/19  09:28    


 


Eos % (Auto)  2.1 % (0.0-4.3)   04/08/19  09:28    


 


Baso % (Auto)  1.5 % (0.0-1.8)   04/08/19  09:28    


 


Lymph #  0.7 K/mm3 (1.2-5.4)  L  04/08/19  09:28    


 


Mono #  0.4 K/mm3 (0.0-0.8)   04/08/19  09:28    


 


Eos #  0.1 K/mm3 (0.0-0.4)   04/08/19  09:28    


 


Baso #  0.1 K/mm3 (0.0-0.1)   04/08/19  09:28    


 


Seg Neutrophils %  75.3 % (40.0-70.0)  H  04/08/19  09:28    


 


Seg Neutrophils #  3.7 K/mm3 (1.8-7.7)   04/08/19  09:28    


 


PT  13.3 Sec. (12.2-14.9)   04/08/19  11:55    


 


INR  0.95  (0.87-1.13)   04/08/19  11:55    


 


Sodium  139 mmol/L (137-145)   04/08/19  09:31    


 


Potassium  5.2 mmol/L (3.6-5.0)  H  04/08/19  09:31    


 


Chloride  106.7 mmol/L ()   04/08/19  09:31    


 


Carbon Dioxide  15 mmol/L (22-30)  L  04/08/19  09:31    


 


Anion Gap  23 mmol/L  04/08/19  09:31    


 


BUN  103 mg/dL (7-17)  H  04/08/19  09:31    


 


Creatinine  12.6 mg/dL (0.7-1.2)  H  04/08/19  09:31    


 


Estimated GFR  3 ml/min  04/08/19  09:31    


 


BUN/Creatinine Ratio  8 %  04/08/19  09:31    


 


Glucose  161 mg/dL ()  H  04/08/19  09:31    


 


POC Glucose  172  ()  H  04/11/19  16:30    


 


Hemoglobin A1c  7.0 % (4-6)  H  04/10/19  07:19    


 


Calcium  5.6 mg/dL (8.4-10.2)  L*  04/08/19  09:31    


 


Total Bilirubin  0.20 mg/dL (0.1-1.2)   04/08/19  09:31    


 


AST  37 units/L (5-40)   04/08/19  09:31    


 


ALT  103 units/L (7-56)  H  04/08/19  09:31    


 


Alkaline Phosphatase  123 units/L ()   04/08/19  09:31    


 


Total Protein  5.6 g/dL (6.3-8.2)  L  04/08/19  09:31    


 


Albumin  2.8 g/dL (3.9-5)  L  04/08/19  09:31    


 


Albumin/Globulin Ratio  1.0 %  04/08/19  09:31    


 


PTH Intact  268.2 pg/mL (15-65)  H  04/08/19  16:11    


 


Hepatitis A IgM Ab  Non-reactive  (NonReactive)   04/08/19  16:11    


 


Hep Bs Antigen  Non-reactive  (Negative)   04/08/19  16:11    


 


Hep B Core IgM Ab  Non-reactive  (NonReactive)   04/08/19  16:11    


 


Hepatitis C Antibody  Non-reactive  (NonReactive)   04/08/19  16:11    














Nutrition/Malnutrition Assess





- Dietary Evaluation


Nutrition/Malnutrition Findings: 


                                        





Nutrition Notes                                            Start:  04/09/19 

14:26


Freq:                                                      Status: Discharge    




Protocol:                                                                       




 Document     04/10/19 13:44  EB  (Rec: 04/10/19 13:53  EB  [id number])


 Co-Sign      04/10/19 13:44  LP


 Nutrition Notes


     Initial or Follow up                        Assessment


     Current Diagnosis                           CKD (stage V CKD),Diabetes,


                                                 Hypertension


     Other Pertinent Diagnosis                   on HD, Anemia


     Current Diet                                Consistent CHO with Renal


                                                 Modification


     Labs/Tests                                  no current labs available


     Pertinent Medications                       Lasix


     Height                                      5 ft 2 in


     Weight                                      60.78 kg


     Ideal Body Weight (kg)                      50.00


     BMI                                         24.5


     Subjective/Other Information                Pt just returned from HD at


                                                 time of visit.  and son


                                                 also present in room and all


                                                 three had questions about diet


                                                 now that pt is receiving HD.


                                                 I gave education regarding


                                                 ESRD on HD combined with T2DM.


                                                 Pt continues eating >75%


                                                 meals.


     Percent of energy/protein needs met:        100%/92% cale and pro needs


     Burn                                        Absent


     Trauma                                      Absent


     Current % PO                                Good (%)


     #1


      Nutrition Diagnosis                        Food and nutrition-related


                                                 knowledge deficit


      Etiology                                   lack of education


      As Evidenced by Signs and Symptoms         no prior knowledge of need for


                                                 nutrition recommendations


     Is patient on ventilator?                   No


     Is Patient Ambulatory and/or Out of Bed     Yes


     REE-(Rutherford-St. Jeor-ambulatory/OOB) [     1398.865


      [email or web address]]                               


     Kcal/Kg value to use for calculation        25


     Approximate Energy Requirements Using       1520


      kcal/Kg                                    


     Additional Notes                            PRO: 1.2-1.3 g/kg (73-80 g/day


                                                 )


                                                 Fluid: 1500 mL or per MD


                                                 recommendation


 Nutrition Intervention


     Change Diet Order:                          Continue current diet


     Goal #1                                     adhere to diet recommendations


     Anticipated Discharge Needs:                Consistent CHO/Renal diet


     Revisit per MD consult or patient           Sign Off


      request:

## 2019-04-11 NOTE — PROGRESS NOTE
Assessment and Plan





Impression:


* ESRD


* Uremia 


* Hypocalcemia 


* Secondary hyperparathyroidism


* Anemia in ESRD


* Hypertension


* Type II DM








PLan:


* Daily HD x 3 - 3rd treatment today


* UF as tolerated - patient continues to make urine


* Epogen TIW prn


* Calcitriol 0.5mcg daily


* CM consulted for outpatient dialysis clinic placement


* Stable for d/c to home once outpatient HD arranged





Subjective


Date of service: 04/11/19


Principal diagnosis: esrd


Interval history: 





resting in bed today





Objective





- Exam


Narrative Exam: 





General appearance: Present: no acute distress





- EENT


Eyes: Present: PERRL


ENT: hearing intact, clear oral mucosa





- Neck


Neck: Present: supple, normal ROM





- Respiratory


Respiratory effort: normal


Respiratory: bilateral: CTA





- Cardiovascular


Heart Sounds: Present: S1 & S2.  Absent: rub, click





- Extremities


Extremities: pulses symmetrical, No edema


Peripheral Pulses: within normal limits





- Abdominal


General gastrointestinal: Present: soft, non-tender, non-distended, normal bowel

sounds


Female genitourinary: Present: normal





- Integumentary


Integumentary: Present: clear, warm, dry





- Musculoskeletal


Musculoskeletal: gait normal, strength equal bilaterally





- Psychiatric


Psychiatric: appropriate mood/affect, intact judgment & insight





- Neurologic


Neurologic: CNII-XII intact, moves all extremities





- Vital Signs


Vital signs: 


                               Vital Signs - 12hr











  04/11/19 04/11/19 04/11/19





  02:41 05:34 07:16


 


Temperature 98.3 F  98.8 F


 


Pulse Rate  80 63


 


Respiratory 20  16





Rate   


 


Blood Pressure 112/51 112/51 105/50


 


O2 Sat by Pulse   91





Oximetry   














  04/11/19 04/11/19 04/11/19





  08:30 08:50 09:00


 


Temperature 93.8 F L  


 


Pulse Rate 62 62 61


 


Respiratory 16  





Rate   


 


Blood Pressure 120/61 120/61 133/64


 


O2 Sat by Pulse   





Oximetry   














  04/11/19 04/11/19 04/11/19





  09:15 09:27 09:30


 


Temperature   


 


Pulse Rate 51 L 61 60


 


Respiratory   





Rate   


 


Blood Pressure 136/65  123/65


 


O2 Sat by Pulse   





Oximetry   














  04/11/19 04/11/19 04/11/19





  09:45 10:00 10:15


 


Temperature   


 


Pulse Rate 63 60 62


 


Respiratory   





Rate   


 


Blood Pressure 123/66 135/65 136/65


 


O2 Sat by Pulse   





Oximetry   














  04/11/19 04/11/19 04/11/19





  10:30 10:45 11:00


 


Temperature   


 


Pulse Rate 64 65 65


 


Respiratory   





Rate   


 


Blood Pressure 136/68 135/70 154/72


 


O2 Sat by Pulse   





Oximetry   














  04/11/19 04/11/19 04/11/19





  11:15 11:30 11:50


 


Temperature   


 


Pulse Rate 64 63 63


 


Respiratory   





Rate   


 


Blood Pressure 154/68 152/72 140/68


 


O2 Sat by Pulse   





Oximetry   














- Lab





                                 04/08/19 09:28





                                 04/08/19 09:31


                             Most recent lab results











Calcium  5.6 mg/dL (8.4-10.2)  L*  04/08/19  09:31    














Medications & Allergies





- Medications


Allergies/Adverse Reactions: 


                                    Allergies





No Known Allergies Allergy (Unverified 12/17/16 15:27)


   








Home Medications: 


                                Home Medications











 Medication  Instructions  Recorded  Confirmed  Last Taken  Type


 


Carvedilol [Coreg] 25 mg PO BID 04/08/19 04/08/19 Unknown History


 


Furosemide [Lasix TAB] 40 mg PO QDAY 04/08/19 04/08/19 Unknown History


 


Hydralazine HCl 50 mg PO TID 04/08/19 04/08/19 Unknown History


 


Insulin Glargine,Hum.rec.anlog 14 units SQ DAILY 04/08/19 04/08/19 Unknown 

History





[Lantus]     


 


Levothyroxine [Synthroid] 75 mcg PO QAM 04/08/19 04/08/19 Unknown History


 


Rosuvastatin Calcium 5 mg PO DAILY 04/08/19 04/08/19 Unknown History


 


glipiZIDE [Glipizide] 5 mg PO DAILY 04/08/19 04/08/19 Unknown History











Active Medications: 














Generic Name Dose Route Start Last Admin





  Trade Name Freq  PRN Reason Stop Dose Admin


 


Acetaminophen  650 mg  04/08/19 11:47  04/09/19 19:59





  Tylenol  PO   650 mg





  Q4H PRN   Administration





  Pain MILD(1-3)/Fever >100.5/HA  


 


Atorvastatin Calcium  10 mg  04/09/19 22:00  04/10/19 22:03





  Lipitor  PO   10 mg





  [facility]HS MARK   Administration


 


Calcitriol  0.5 mcg  04/09/19 19:00  04/10/19 11:02





  Rocaltrol  PO   0.5 mcg





  QDAY MARK   Administration


 


Calcium Carbonate/Glycine  1,000 mg  04/08/19 20:00  04/11/19 07:30





  Tums  PO   1,000 mg





  TID MARK   Administration


 


Carvedilol  25 mg  04/09/19 22:00  04/10/19 22:04





  Coreg  PO   25 mg





  BID MARK   Administration


 


Dextrose  50 ml  04/08/19 12:09  04/09/19 06:37





  D50w (25gm) Syringe  IV   50 ml





  PRN PRN   Administration





  Hypoglycemia  


 


Epoetin Ashish  10,000 unit  04/09/19 18:44  04/11/19 10:43





  Procrit  IV   10,000 unit





  MICHELLE PRN   Administration





  hemodialysis  


 


Furosemide  40 mg  04/10/19 06:00  04/11/19 05:33





  Lasix  PO   40 mg





  DAILY@0600 MARK   Administration


 


Heparin Sodium (Porcine)  5,000 unit  04/09/19 14:00  04/11/19 05:33





  Heparin  SUB-Q   5,000 unit





  Q8HR MARK   Administration


 


Heparin Sodium (Porcine)  5,000 unit  04/09/19 18:44 





  Heparin  IV  





  MICHELLE PRN  





  hemodialysis  


 


Hydralazine HCl  50 mg  04/09/19 14:00  04/11/19 05:34





  Apresoline  PO   Not Given





  Q8HR MARK  


 


Hydralazine HCl  10 mg  04/09/19 11:26 





  Apresoline  IV  





  Q4HR PRN  





  BP >160/100  


 


Sodium Chloride  500 mls @ 50 mls/hr  04/09/19 08:00  04/09/19 08:35





  Nacl 0.9% 500 Ml  IV   0 mls





  AS DIRECT MARK   Administration


 


Sodium Chloride  100 mls @ 999 mls/hr  04/09/19 18:44 





  Nacl 0.9%  IV  





  MICHELLE PRN  





  Hypotension  


 


Insulin Glargine  14 units  04/09/19 22:00  04/10/19 22:06





  Lantus  SUB-Q   14 units





  QHS MARK   Administration


 


Insulin Human Lispro  0 unit  04/09/19 11:30  04/11/19 07:11





  Humalog  SUB-Q   Not Given





  [facility]  





  Protocol  


 


Levothyroxine Sodium  75 mcg  04/10/19 06:00  04/11/19 05:33





  Synthroid  PO   75 mcg





  DAILY@0600 MARK   Administration


 


Lisinopril  40 mg  04/09/19 10:00  04/10/19 11:02





  Zestril  PO   40 mg





  QDAY MARK   Administration


 


Nifedipine  60 mg  04/09/19 12:00  04/10/19 22:04





  Procardia Xl  PO   60 mg





  Q12HR MARK   Administration


 


Ondansetron HCl  4 mg  04/08/19 11:47  04/10/19 07:48





  Zofran  IV   4 mg





  Q8H PRN   Administration





  Nausea And Vomiting  


 


Sodium Chloride  10 ml  04/08/19 22:00  04/10/19 22:06





  Sodium Chloride Flush Syringe 10 Ml  IV   10 ml





  BID MARK   Administration


 


Sodium Chloride  10 ml  04/08/19 11:47 





  Sodium Chloride Flush Syringe 10 Ml  IV  





  PRN PRN  





  LINE FLUSH

## 2019-04-11 NOTE — DISCHARGE SUMMARY
<REBA AKHTAR - Last Filed: 04/12/19 15:17>





Providers





- Providers


Date of Admission: 


04/08/19 11:47





Date of discharge: 04/11/19


Attending physician: 


BRIGID GARCIA MD





                                        





04/08/19 10:49


Consult to Physician [CONS] Urgent 


   Comment: Coco STEELE notified @ 11:15- LXM


   Consulting Provider: CHANDRIKA RUIZ


   Physician Instructions: 


   Reason For Exam: esrd needing first time dialysis





04/08/19 10:50


Consult to Physician [CONS] Urgent 


   Comment: WOODY OLIVA SPOKE TO THE NURSE/MICHAEL


   Consulting Provider: RAY ESQUEDA


   Physician Instructions: 


   Reason For Exam: esrd needing first time dialysis





04/08/19 15:52


Consult to Case Management [CONS] Routine 


   Services Needed at Discharge: Other


   Notified:: spoke with luis


   Additional Physician Instructions: dialysis unit placement, davita





04/08/19 18:05


Physical Therapy Evaluation and Treat [CONS] Routine 


   Comment: 


   Reason For Exam: weakness





04/09/19 10:49


Consult to Dietitian/Nutrition [CONS] Routine 


   Physician Instructions: 


   Reason For Exam: 


   Reason for Consult: Malnutrition











Primary care physician: 


VALENTINE MONTANA








Hospitalization


Condition: Stable


Disposition: DC-01 TO HOME OR SELFCARE





Exam





- Constitutional


Vitals: 


                                        











Temp Pulse Resp BP Pulse Ox


 


 98.1 F   72   18   110/44   91 


 


 04/11/19 13:10  04/11/19 13:10  04/11/19 13:10  04/11/19 13:10  04/11/19 13:10














Plan


Follow up with: 


VALENTINE MONTANA MD [Primary Care Provider] - 7 Days


Prescriptions: 


NIFEdipine XL [Procardia Xl] 60 mg PO Q12HR #60 tablet


Calcitriol [Rocaltrol] 0.5 mcg PO QDAY #30 capsule


Calcium Carbonate [Tums] 1,000 mg PO TID 30 Days  tablet


Lisinopril [Zestril TAB] 40 mg PO QDAY #30 tablet





<BRIGID GARCIA - Last Filed: 04/12/19 17:41>





Providers





- Providers


Date of Admission: 


04/08/19 11:47





Attending physician: 


BRIGID GARCIA MD





                                        





04/08/19 10:49


Consult to Physician [CONS] Urgent 


   Comment: Coco STEELE notified @ 11:15- LXM


   Consulting Provider: CHANDRIKA RUIZ


   Physician Instructions: 


   Reason For Exam: esrd needing first time dialysis





04/08/19 10:50


Consult to Physician [CONS] Urgent 


   Comment: WOODY PJ SPOKE TO THE NURSE/MICHAEL


   Consulting Provider: RAY ESQUEDA


   Physician Instructions: 


   Reason For Exam: esrd needing first time dialysis





04/08/19 15:52


Consult to Case Management [CONS] Routine 


   Services Needed at Discharge: Other


   Notified:: spoke with luis


   Additional Physician Instructions: dialysis unit placement, davita





04/08/19 18:05


Physical Therapy Evaluation and Treat [CONS] Routine 


   Comment: 


   Reason For Exam: weakness





04/09/19 10:49


Consult to Dietitian/Nutrition [CONS] Routine 


   Physician Instructions: 


   Reason For Exam: 


   Reason for Consult: Malnutrition











Primary care physician: 


VALENTINE MONTANA








Hospitalization


Hospital course: 





73F w pmh of ESRD who was previously refusing dialysis, presents with worsening 

Uremia. she is planning for kidney transplant





pmh; dm, esrd, htn, hypothyroidism





Dx


esrd


uremia


htn urgency


AOCD


hyperkalemia


Severe malnutrition


DM








Permacath was placed, she was started on dialysis, high blood pressure 

medications were optimized. She was restarted on her home dose of lantus.  

Dialysis chair time and placement was obtained prior to discharge.


Time spent for discharge: 33 mins





Core Measure Documentation





- Palliative Care


Palliative Care/ Comfort Measures: Not Applicable





- Core Measures


Any of the following diagnoses?: none





Exam





- Constitutional


Vitals: 


                                        











Temp Pulse Resp BP Pulse Ox


 


 98.1 F   72   18   110/44   91 


 


 04/11/19 13:10  04/11/19 13:10  04/11/19 13:10  04/11/19 13:10  04/11/19 13:10











General appearance: Present: no acute distress, well-nourished





- EENT


Eyes: Present: PERRL


ENT: hearing intact, clear oral mucosa





- Neck


Neck: Present: supple, normal ROM





- Respiratory


Respiratory effort: normal


Respiratory: bilateral: CTA





- Cardiovascular


Heart Sounds: Present: S1 & S2.  Absent: rub, click





- Extremities


Extremities: pulses symmetrical, No edema


Peripheral Pulses: within normal limits





- Abdominal


General gastrointestinal: Present: soft, non-tender, non-distended, normal bowel

 sounds


Female genitourinary: Present: normal





- Integumentary


Integumentary: Present: clear, warm, dry





- Musculoskeletal


Musculoskeletal: gait normal, strength equal bilaterally





- Psychiatric


Psychiatric: appropriate mood/affect, intact judgment & insight





- Neurologic


Neurologic: CNII-XII intact, moves all extremities

## 2019-04-12 NOTE — DISCHARGE SUMMARY
Providers





- Providers


Date of Admission: 


04/08/19 11:47





Date of discharge: 04/11/19


Attending physician: 


BRIGID GARCIA MD





                                        





04/08/19 10:49


Consult to Physician [CONS] Urgent 


   Comment: Coco STEELE notified @ 11:15- LXM


   Consulting Provider: CHANDRIKA RUIZ


   Physician Instructions: 


   Reason For Exam: esrd needing first time dialysis





04/08/19 10:50


Consult to Physician [CONS] Urgent 


   Comment: WOODY PJ SPOKE TO THE NURSE/MICHAEL


   Consulting Provider: RAY ESQUEDA


   Physician Instructions: 


   Reason For Exam: esrd needing first time dialysis





04/08/19 15:52


Consult to Case Management [CONS] Routine 


   Services Needed at Discharge: Other


   Notified:: spoke with luis Cabrales Physician Instructions: dialysis unit placement, davita





04/08/19 18:05


Physical Therapy Evaluation and Treat [CONS] Routine 


   Comment: 


   Reason For Exam: weakness





04/09/19 10:49


Consult to Dietitian/Nutrition [CONS] Routine 


   Physician Instructions: 


   Reason For Exam: 


   Reason for Consult: Malnutrition











Primary care physician: 


VALENTINE MONTANA








Hospitalization


Reason for admission: ESRD


Condition: Stable


Hospital course: 





73 year old female with history of ESRD who previously refused hemodialysis 

treatment d/t wanting undergo a renal transplant presented to the ED with 

complaints of nausea, fatigue, generalized weakness and overall not feeling well

 for the past month with symptoms worsening over the past week.  She was found 

to have ESRD, acidosis, hyperkalemia with no evidence of ECG changes, and anemia

 of chronic disease subsequently admitted on 4/8.  She underwent PermCath 

placement on 4/10.  At the time of my examination patient is currently receiving

 hemodialysis.  








Shas been established with [facility] for outpatient HD.  This was 

discussed with patient and her .  They are both in agreement and 

verbalized understanding . 








ESRD


Being managed by nephrology


Currently receiving hemodialysis


Has received hemodialysis on 4/9 and 4/10


Pending outpatient hemodialysis clinic placement


Patient plans to pursue renal transplant [facility]








Chronic anemia


Epogen prn





Hypertension


Continue to monitor BP


Continue antihypertensive medication





Hypocalcemia


On calcitriol.5mcg BID





Diabetes mellitus 


Continue POC BG monitoring, SSI and scheduled coverage 





Disposition: DC-01 TO HOME OR SELFCARE





Core Measure Documentation





- Palliative Care


Palliative Care/ Comfort Measures: Not Applicable





- Core Measures


Any of the following diagnoses?: none





- VTE Discharge Requirements


Deep Vein Thrombosis/Pulmonary Embolism Present on Admission: No


Has pt received <5 days of overlap therapy or INR<2.0: No


Anticoagulant overlap therapy prescribed at discharge: No


Contraindication No Overlap Therapy order at DC: Not Indicated





Exam





- Constitutional


Vitals: 


                                        











Temp Pulse Resp BP Pulse Ox


 


 98.1 F   72   18   110/44   91 


 


 04/11/19 13:10  04/11/19 13:10  04/11/19 13:10  04/11/19 13:10  04/11/19 13:10











General appearance: Present: no acute distress





- EENT


Eyes: Present: PERRL





- Neck


Neck: Present: supple





- Respiratory


Respiratory: bilateral: CTA





- Cardiovascular


Rhythm: regular


Heart Sounds: Present: S1 & S2





- Extremities


Extremities: pulses intact


Extremity abnormal: edema





- Peripheral Assessment


  ** Generalized


Edema Type: Non-pitting


Capillary Refill: < 3 seconds


Skin Temperature: Warm


Peripheral Pulses: within normal limits





- Abdominal


General gastrointestinal: Present: soft, non-tender


Female genitourinary: Present: deferred





- Rectal


Rectal Exam: deferred





- Integumentary


Integumentary: Present: warm, dry





- Musculoskeletal


Musculoskeletal: strength equal bilaterally





- Psychiatric


Psychiatric: appropriate mood/affect





- Neurologic


Neurologic: CNII-XII intact





Plan


Diet: renal


Follow up with: 


VALENTINE MONTANA MD [Primary Care Provider] - 7 Days


Prescriptions: 


NIFEdipine XL [Procardia Xl] 60 mg PO Q12HR #60 tablet


Calcitriol [Rocaltrol] 0.5 mcg PO QDAY #30 capsule


Calcium Carbonate [Tums] 1,000 mg PO TID 30 Days  tablet


Lisinopril [Zestril TAB] 40 mg PO QDAY #30 tablet

## 2019-09-30 ENCOUNTER — HOSPITAL ENCOUNTER (INPATIENT)
Dept: HOSPITAL 5 - ED | Age: 74
LOS: 2 days | Discharge: HOME | DRG: 280 | End: 2019-10-02
Attending: INTERNAL MEDICINE | Admitting: HOSPITALIST
Payer: MEDICARE

## 2019-09-30 DIAGNOSIS — Z79.4: ICD-10-CM

## 2019-09-30 DIAGNOSIS — D63.8: ICD-10-CM

## 2019-09-30 DIAGNOSIS — E87.5: ICD-10-CM

## 2019-09-30 DIAGNOSIS — I16.0: ICD-10-CM

## 2019-09-30 DIAGNOSIS — I50.31: ICD-10-CM

## 2019-09-30 DIAGNOSIS — E87.2: ICD-10-CM

## 2019-09-30 DIAGNOSIS — Z99.2: ICD-10-CM

## 2019-09-30 DIAGNOSIS — I13.2: ICD-10-CM

## 2019-09-30 DIAGNOSIS — E03.9: ICD-10-CM

## 2019-09-30 DIAGNOSIS — I27.20: ICD-10-CM

## 2019-09-30 DIAGNOSIS — I21.A1: Primary | ICD-10-CM

## 2019-09-30 DIAGNOSIS — E78.5: ICD-10-CM

## 2019-09-30 DIAGNOSIS — E87.70: ICD-10-CM

## 2019-09-30 DIAGNOSIS — N18.6: ICD-10-CM

## 2019-09-30 DIAGNOSIS — J90: ICD-10-CM

## 2019-09-30 LAB
ALBUMIN SERPL-MCNC: 4 G/DL (ref 3.9–5)
ALT SERPL-CCNC: 35 UNITS/L (ref 7–56)
BUN SERPL-MCNC: 98 MG/DL (ref 7–17)
BUN/CREAT SERPL: 8 %
CALCIUM SERPL-MCNC: 8.1 MG/DL (ref 8.4–10.2)
HCT VFR BLD CALC: 29 % (ref 30.3–42.9)
HEMOLYSIS INDEX: 3
HGB BLD-MCNC: 9.6 GM/DL (ref 10.1–14.3)
MCHC RBC AUTO-ENTMCNC: 33 % (ref 30–34)
MCV RBC AUTO: 99 FL (ref 79–97)
PLATELET # BLD: 243 K/MM3 (ref 140–440)
RBC # BLD AUTO: 2.94 M/MM3 (ref 3.65–5.03)

## 2019-09-30 PROCEDURE — 83880 ASSAY OF NATRIURETIC PEPTIDE: CPT

## 2019-09-30 PROCEDURE — 93005 ELECTROCARDIOGRAM TRACING: CPT

## 2019-09-30 PROCEDURE — 71045 X-RAY EXAM CHEST 1 VIEW: CPT

## 2019-09-30 PROCEDURE — 82962 GLUCOSE BLOOD TEST: CPT

## 2019-09-30 PROCEDURE — 80053 COMPREHEN METABOLIC PANEL: CPT

## 2019-09-30 PROCEDURE — 71046 X-RAY EXAM CHEST 2 VIEWS: CPT

## 2019-09-30 PROCEDURE — 85027 COMPLETE CBC AUTOMATED: CPT

## 2019-09-30 PROCEDURE — 80061 LIPID PANEL: CPT

## 2019-09-30 PROCEDURE — 36415 COLL VENOUS BLD VENIPUNCTURE: CPT

## 2019-09-30 PROCEDURE — 80074 ACUTE HEPATITIS PANEL: CPT

## 2019-09-30 PROCEDURE — 84484 ASSAY OF TROPONIN QUANT: CPT

## 2019-09-30 PROCEDURE — 93010 ELECTROCARDIOGRAM REPORT: CPT

## 2019-09-30 PROCEDURE — 80048 BASIC METABOLIC PNL TOTAL CA: CPT

## 2019-09-30 PROCEDURE — 93306 TTE W/DOPPLER COMPLETE: CPT

## 2019-10-01 LAB — HDLC SERPL-MCNC: 76 MG/DL (ref 40–59)

## 2019-10-01 PROCEDURE — 5A1D70Z PERFORMANCE OF URINARY FILTRATION, INTERMITTENT, LESS THAN 6 HOURS PER DAY: ICD-10-PCS | Performed by: INTERNAL MEDICINE

## 2019-10-01 RX ADMIN — HEPARIN SODIUM SCH UNIT: 5000 INJECTION, SOLUTION INTRAVENOUS; SUBCUTANEOUS at 05:18

## 2019-10-01 RX ADMIN — INSULIN LISPRO SCH: 100 INJECTION, SOLUTION INTRAVENOUS; SUBCUTANEOUS at 12:05

## 2019-10-01 RX ADMIN — HEPARIN SODIUM SCH UNIT: 5000 INJECTION, SOLUTION INTRAVENOUS; SUBCUTANEOUS at 14:02

## 2019-10-01 RX ADMIN — INSULIN LISPRO SCH UNIT: 100 INJECTION, SOLUTION INTRAVENOUS; SUBCUTANEOUS at 17:53

## 2019-10-01 RX ADMIN — CALCITRIOL SCH MCG: 0.5 CAPSULE, LIQUID FILLED ORAL at 10:28

## 2019-10-01 RX ADMIN — HEPARIN SODIUM SCH UNIT: 5000 INJECTION, SOLUTION INTRAVENOUS; SUBCUTANEOUS at 21:54

## 2019-10-01 RX ADMIN — NIFEDIPINE SCH MG: 60 TABLET, EXTENDED RELEASE ORAL at 10:30

## 2019-10-01 RX ADMIN — CALCIUM CARBONATE (ANTACID) CHEW TAB 500 MG SCH MG: 500 CHEW TAB at 21:52

## 2019-10-01 RX ADMIN — NIFEDIPINE SCH MG: 60 TABLET, EXTENDED RELEASE ORAL at 21:53

## 2019-10-01 RX ADMIN — HEPARIN SODIUM SCH: 5000 INJECTION, SOLUTION INTRAVENOUS; SUBCUTANEOUS at 14:08

## 2019-10-01 RX ADMIN — CALCIUM CARBONATE (ANTACID) CHEW TAB 500 MG SCH MG: 500 CHEW TAB at 14:02

## 2019-10-01 RX ADMIN — CALCIUM CARBONATE (ANTACID) CHEW TAB 500 MG SCH MG: 500 CHEW TAB at 08:29

## 2019-10-01 RX ADMIN — LEVOTHYROXINE SODIUM SCH MCG: 0.07 TABLET ORAL at 05:26

## 2019-10-01 RX ADMIN — FUROSEMIDE SCH MG: 40 TABLET ORAL at 10:27

## 2019-10-01 RX ADMIN — INSULIN LISPRO SCH UNIT: 100 INJECTION, SOLUTION INTRAVENOUS; SUBCUTANEOUS at 08:29

## 2019-10-01 RX ADMIN — INSULIN LISPRO SCH UNIT: 100 INJECTION, SOLUTION INTRAVENOUS; SUBCUTANEOUS at 21:54

## 2019-10-01 NOTE — CONSULTATION
History of Present Illness





- History of Present Illness





74 year old with medical history significant for HTN, DM type II longstanding 

for about 20 years , ESRD on hemodialysis via a Left arm AVF on Monday, 

Wednesday and Friday, presenting with complaints of shortness of breath 

following missed dialysis . She does not have a reason for missing her dialysis 

treatment and apparently just decided not to go to her scheduled treatment. 

There is associated exertional dypsnea , orthopnea , PND. Patient denies any 

associated fevers or chills.Denies any lower extremity edema.  





Past History


Past Medical History: diabetes, dialysis (M/W/F), ESRD, hypertension, 

hyperlipidemia, hypothyroidism


Past Surgical History:  (X2), thyroidectomy, Other (Vas-cath, AV graft 

in left arm, LT eye surgery)


Social history: other (patient admits to occasional alcohol use but denies 

tobacco or illicit drug use)


Family history: diabetes (mother), hypertension (mother, father)





Medications and Allergies


                                    Allergies











Allergy/AdvReac Type Severity Reaction Status Date / Time


 


No Known Allergies Allergy   Verified 19 22:48











                                Home Medications











 Medication  Instructions  Recorded  Confirmed  Last Taken  Type


 


Carvedilol [Coreg] 25 mg PO BID 04/08/19 10/01/19 09/30/19 History


 


Furosemide [Lasix TAB] 40 mg PO QDAY 04/08/19 10/01/19 09/30/19 History


 


Insulin Glargine,Hum.rec.anlog 14 units SQ DAILY 04/08/19 10/01/19 09/30/19 

History





[Lantus]     


 


Levothyroxine [Synthroid] 75 mcg PO QAM 04/08/19 10/01/19 09/30/19 History


 


Rosuvastatin Calcium 5 mg PO DAILY 04/08/19 10/01/19 09/30/19 History


 


glipiZIDE [Glipizide] 5 mg PO DAILY 04/08/19 10/01/19 09/30/19 History


 


Calcitriol [Rocaltrol] 0.5 mcg PO QDAY #30 capsule 04/11/19 10/01/19 Unknown Rx


 


Calcium Carbonate [Tums 500MG CHEW] 1,000 mg PO TID 30 Days  tablet 04/11/19 

10/01/19 Unknown Rx


 


NIFEdipine XL [Procardia Xl] 60 mg PO Q12HR #60 tablet 04/11/19 10/01/19 

09/30/19 Rx


 


Vitamin D3 1,000 UNIT TAB 2,000 units PO DAILY 10/01/19 10/01/19 Unknown History











Active Meds: 


Active Medications





Atorvastatin Calcium (Lipitor)  10 mg PO DAILY [facility]


Calcitriol (Rocaltrol)  0.5 mcg PO QDAY [facility]


Calcium Carbonate/Glycine (Tums)  1,000 mg PO TID [facility]


   Last Admin: 10/01/19 08:29 Dose:  1,000 mg


   Documented by: 


Carvedilol (Coreg)  25 mg PO BID [facility]


Dextrose (D50w (25gm) Syringe)  50 ml IV PRN PRN


   PRN Reason: Hypoglycemia


Furosemide (Lasix)  40 mg PO QDAY [facility]


Heparin Sodium (Porcine) (Heparin)  5,000 unit SUB-Q Q8HR [facility]


   Last Admin: 10/01/19 05:18 Dose:  5,000 unit


   Documented by: 


Hydralazine HCl (Apresoline)  10 mg IV Q4HR PRN


   PRN Reason: Blood Pressure


Sodium Chloride (Nacl 0.9%)  100 mls @ 999 mls/hr IV MICHELLE PRN


   PRN Reason: Hypotension


Insulin Glargine (Lantus)  10 units SUB-Q [facility] MARK


Insulin Human Lispro (Humalog)  0 unit SUB-Q ACHS [facility]; Protocol


   Last Admin: 10/01/19 08:29 Dose:  2 unit


   Documented by: 


Levothyroxine Sodium (Synthroid)  75 mcg PO QAM@0600 [facility]


   Last Admin: 10/01/19 05:26 Dose:  75 mcg


   Documented by: 


Nifedipine (Procardia Xl)  60 mg PO Q12HR [facility]











Review of Systems


Constitutional: fever, chills, fatigue, weakness, no weight loss, no weight gain


Ears, nose, mouth and throat: deferred, ear pain


Breasts: no deferred


Cardiovascular: dyspnea on exertion, no chest pain, no orthopnea


Respiratory: no cough, no cough with sputum


Gastrointestinal: no abdominal pain, no nausea, no vomiting


Musculoskeletal: no neck stiffness, no neck pain


Integumentary: no deferred, no rash


Psychiatric: no anxiety, no memory loss


Endocrine: no cold intolerance, no heat intolerance


Hematologic/Lymphatic: no easy bruising, no easy bleeding





Exam





- Vital Signs


Vital signs: 


                                   Vital Signs











Temp Pulse Resp BP Pulse Ox


 


 98.4 F   71   18   141/59   96 


 


 19 22:53  19 22:53  09/30/19 22:53  19 22:53  19 22:53














- General Appearance


General appearance: well-developed, well-nourished


EENT: ATNC, PERRL


Neck: Present: neck supple


Respiratory: Decreased Breath Sounds


Heart: regular, S1S2


Gastrointestinal: Present: normal, normoactive bowel sounds


Integumentary: no rash


Neurologic: no focal deficit, alert and oriented x3


Musculoskeletal: Absent: deferred, deformities


Psychiatric: mood/affect appropriate





Results





- Lab Results





                                 19 23:08





                                 19 23:08


                             Most recent lab results











Calcium  8.1 mg/dL (8.4-10.2)  L  19  23:08    














- Image


Kidney/bladder ultrasound: other (I reviewed CXR with bibasilar patchy opacities

consistent with congestion. )





Assessment and Plan





- Patient Problems


(1) ESRD needing dialysis


Current Visit: Yes   Status: Acute   


Plan to address problem: 


ESRD on hemodialysis 


- Received hemodialysis overnight 


- Repeat HD tomorrow if still inpatient 


- access: left arm AVF 


- has Right IJ cath which is scheduled to be removed. 








(2) HTN (hypertension)


Current Visit: No   Status: Acute   


Qualifiers: 


   Hypertension type: essential hypertension   Qualified Code(s): I10 - 

Essential (primary) hypertension   


Plan to address problem: 


HTN:  uncontrolled. 


Ensure medications 


monitor Blood pressure 








(3) Diabetes


Current Visit: No   Status: Acute   


Plan to address problem: 


DM type II :  uncontrolled. 


Ensure medications 


Monitor blood glucose. 








(4) Hyperkalemia


Current Visit: Yes   Status: Acute   


Plan to address problem: 


Hyperkalemia : 


Received hemodialysis overnight. 


Recheck labs. 








(5) Volume overload


Current Visit: Yes   Status: Acute   


Plan to address problem: 


Volume overload : 


- CXR with edema 


- Received HD only 2L removed per nurse.

## 2019-10-01 NOTE — EMERGENCY DEPARTMENT REPORT
HPI





- General


Chief Complaint: Dyspnea/Respdistress


Time Seen by Provider: 10/01/19 00:08





- HPI


HPI: 





Room 25








The pt is a 73 y/o F p/w a cc of SOB. The pt has a h/o ESRD and states she was 

unable to attend HD today. The pt states this evening she developed SOB. The SOB

has been intermittent





ED Past Medical Hx





- Past Medical History


Previous Medical History?: Yes


Hx Hypertension: Yes


Hx Diabetes: Yes


Hx Renal Disease: Yes (Diaylsis MW)





- Surgical History


Past Surgical History?: Yes


Additional Surgical History:   x2.  Vas cath.  graft to left arm.  left

eye.  goiter removed





- Family History


Family history: no significant





- Social History


Smoking Status: Never Smoker


Substance Use Type: Alcohol (occ)





- Medications


Home Medications: 


                                Home Medications











 Medication  Instructions  Recorded  Confirmed  Last Taken  Type


 


Carvedilol [Coreg] 25 mg PO BID 19 Unknown History


 


Furosemide [Lasix TAB] 40 mg PO QDAY 19 Unknown History


 


Hydralazine HCl 50 mg PO TID 19 Unknown History


 


Insulin Glargine,Hum.rec.anlog 14 units SQ DAILY 19 Unknown 

History





[Lantus]     


 


Levothyroxine [Synthroid] 75 mcg PO QAM 19 Unknown History


 


Rosuvastatin Calcium 5 mg PO DAILY 19 Unknown History


 


glipiZIDE [Glipizide] 5 mg PO DAILY 19 Unknown History


 


Calcitriol [Rocaltrol] 0.5 mcg PO QDAY #30 capsule 19  Unknown Rx


 


Calcium Carbonate [Tums 500MG CHEW] 1,000 mg PO TID 30 Days  tablet 19  

Unknown Rx


 


Lisinopril [Zestril TAB] 40 mg PO QDAY #30 tablet 19  Unknown Rx


 


NIFEdipine XL [Procardia Xl] 60 mg PO Q12HR #60 tablet 19  Unknown Rx














ED Review of Systems


ROS: 


Stated complaint: SOB


Other details as noted in HPI





Constitutional: no symptoms reported


Eyes: denies: eye pain


ENT: denies: throat pain


Respiratory: shortness of breath


Cardiovascular: denies: chest pain


Endocrine: no symptoms reported


Gastrointestinal: denies: abdominal pain


Genitourinary: denies: dysuria


Musculoskeletal: denies: back pain


Neurological: denies: headache





Physical Exam





- Physical Exam


Vital Signs: 


                                   Vital Signs











  19





  22:53


 


Temperature 98.4 F


 


Pulse Rate 71


 


Respiratory 18





Rate 


 


Blood Pressure 141/59


 


O2 Sat by Pulse 96





Oximetry 











Physical Exam: 





GEN: WD WN F sitting in chair in NAD


HEENT: EOMI


NECK: Trachea midline


LUNGS: RLL cracklesNo resp Distress


CV: RRR no  m/r/g


ABD: S/NT/ND


SKIN: No Diaphoresis


NEURO: GCS 15


MS: no evidence of acute injury








ED Course


                                   Vital Signs











  19





  22:53


 


Temperature 98.4 F


 


Pulse Rate 71


 


Respiratory 18





Rate 


 


Blood Pressure 141/59


 


O2 Sat by Pulse 96





Oximetry 














- Consultations


Consultation #1: 





10/01/19 00:41


nephrology paged


10/01/19 00:48


case d/w Dr Lopez- ab peraza for HD tonight. ok to give insulin/d50/Ca+








Consultation #2: 





10/01/19 00:49


Charge nurse Jessica instructed to contact on call HD nurse per Dr Lopez's request





ED Medical Decision Making





- Lab Data


Result diagrams: 


                                 19 23:08





                                 19 23:08





                                Laboratory Tests











  19





  23:08 23:08


 


WBC  7.7 


 


RBC  2.94 L 


 


Hgb  9.6 L 


 


Hct  29.0 L 


 


MCV  99 H 


 


MCH  33 H 


 


MCHC  33 


 


RDW  15.0 


 


Plt Count  243 


 


Sodium   135 L


 


Potassium   5.7 H


 


Chloride   96.8 L


 


Carbon Dioxide   21 L


 


Anion Gap   23


 


BUN   98 H


 


Creatinine   11.6 H


 


Estimated GFR   3


 


BUN/Creatinine Ratio   8


 


Glucose   147 H


 


Calcium   8.1 L


 


Total Bilirubin   0.40


 


AST   38


 


ALT   35


 


Alkaline Phosphatase   76


 


Troponin T   0.122 H*


 


Total Protein   7.3


 


Albumin   4.0


 


Albumin/Globulin Ratio   1.2














- EKG Data


-: EKG Interpreted by Me


EKG shows normal: sinus rhythm


Rate: normal





- EKG Data


Interpretation: other (peaked t waves)





- Radiology Data


Radiology results: image reviewed (CXR)


interpreted by me: 





CXR- R pleural effusion





- Differential Diagnosis


ESRD, hyperkalemia, pulm edema


Critical care attestation.: 


If time is entered above; I have spent that time in minutes in the direct care 

of this critically ill patient, excluding procedure time.








ED Disposition


Clinical Impression: 


 ESRD needing dialysis, Hyperkalemia, Pleural effusion





Disposition:  OP ADMIT IP TO THIS HOSP


Is pt being admited?: Yes


Does the pt Need Aspirin: No


Condition: Fair


Time of Disposition: 00:49 (Hospitalist paged (Dr Gonzales))

## 2019-10-01 NOTE — EVENT NOTE
Date: 10/01/19


Patient seen and examined, clinically stable at this time. Continue current 

treatment plan. Obtain cardiology eval in the setting of Possible Type 2 MI but 

noted decreased EF. no prior record at our facility


Hepatitis Profile is negative

## 2019-10-01 NOTE — HISTORY AND PHYSICAL REPORT
History of Present Illness


Date of examination: 10/01/19


Date of admission: 


10/01/19 02:05





Chief complaint: 





Shortness of breath


History of present illness: 





Patient is a 74-year-old  female with history of ESRD on HD who presented 

to the ED on account of few hours history of worsening sob.  She stated that she

missed her dialysis today because she had other appointments.  She has 

associated cough productive of whitish sputum and bilateral leg swelling.  She 

denies chest pain, palpitation, diaphoresis, fever, chills, headaches, nausea, 

vomiting, lightheadedness, syncope or LOC.  No abdominal pain, constipation or 

diarrhea.





Past History


Past Medical History: diabetes, dialysis (M/W/F), ESRD, hypertension, hyperlipi

demia, hypothyroidism


Past Surgical History:  (X2), thyroidectomy, Other (Vas-cath, AV graft 

in left arm, LT eye surgery)


Social history: other (patient admits to occasional alcohol use but denies 

tobacco or illicit drug use)


Family history: diabetes (mother), hypertension (mother, father)





Medications and Allergies


                                    Allergies











Allergy/AdvReac Type Severity Reaction Status Date / Time


 


No Known Allergies Allergy   Verified 19 22:48











                                Home Medications











 Medication  Instructions  Recorded  Confirmed  Last Taken  Type


 


Carvedilol [Coreg] 25 mg PO BID 04/08/19 10/01/19 09/30/19 History


 


Furosemide [Lasix TAB] 40 mg PO QDAY 04/08/19 10/01/19 09/30/19 History


 


Insulin Glargine,Hum.rec.anlog 14 units SQ DAILY 04/08/19 10/01/19 09/30/19 

History





[Lantus]     


 


Levothyroxine [Synthroid] 75 mcg PO QAM 04/08/19 10/01/19 09/30/19 History


 


Rosuvastatin Calcium 5 mg PO DAILY 04/08/19 10/01/19 09/30/19 History


 


glipiZIDE [Glipizide] 5 mg PO DAILY 04/08/19 10/01/19 09/30/19 History


 


Calcitriol [Rocaltrol] 0.5 mcg PO QDAY #30 capsule 04/11/19 10/01/19 Unknown Rx


 


Calcium Carbonate [Tums 500MG CHEW] 1,000 mg PO TID 30 Days  tablet 04/11/19 

10/01/19 Unknown Rx


 


NIFEdipine XL [Procardia Xl] 60 mg PO Q12HR #60 tablet 04/11/19 10/01/19 

09/30/19 Rx


 


Vitamin D3 1,000 UNIT TAB 2,000 10/01/19  Unknown History











Active Meds: 


Active Medications





Sodium Chloride (Nacl 0.9%)  100 mls @ 999 mls/hr IV MICHELLE PRN


   PRN Reason: Hypotension


Sodium Chloride (Nacl 0.9%)  100 mls @ 999 mls/hr IV MICHELLE PRN


   PRN Reason: Hypotension











Review of Systems


All systems: negative (all other systems reviewed with the patient and are 

negative unless otherwise stated above)





Exam





- Constitutional


Vitals: 


                                        











Temp Pulse Resp BP Pulse Ox


 


 98.0 F   82   20   191/82   97 


 


 10/01/19 02:48  10/01/19 02:48  10/01/19 02:48  10/01/19 02:48  10/01/19 02:48











General appearance: Present: no acute distress, well-nourished





- EENT


Eyes: Present: PERRL, EOM intact


ENT: hearing intact, clear oral mucosa





- Neck


Neck: Present: supple, normal ROM





- Respiratory


Respiratory effort: normal


Respiratory: bilateral: diminished, rales





- Cardiovascular


Rhythm: regular


Heart Sounds: Present: S1 & S2





- Extremities


Extremities: pulses symmetrical


Extremity abnormal: edema (trace edema in BLE)


Peripheral Pulses: within normal limits





- Abdominal


General gastrointestinal: Present: soft, non-tender, non-distended, normal bowel

sounds


Female genitourinary: Present: deferred





- Rectal


Rectal Exam: deferred





- Integumentary


Integumentary: Present: clear, warm, dry





- Musculoskeletal


Musculoskeletal: gait normal, strength equal bilaterally





- Psychiatric


Psychiatric: appropriate mood/affect, intact judgment & insight





- Neurologic


Neurologic: CNII-XII intact, moves all extremities





Results





- Labs


CBC & Chem 7: 


                                 19 23:08





                                 19 23:08


Labs: 


                             Laboratory Last Values











WBC  7.7 K/mm3 (4.5-11.0)   19  23:08    


 


RBC  2.94 M/mm3 (3.65-5.03)  L  19  23:08    


 


Hgb  9.6 gm/dl (10.1-14.3)  L  19  23:08    


 


Hct  29.0 % (30.3-42.9)  L  19  23:08    


 


MCV  99 fl (79-97)  H  19  23:08    


 


MCH  33 pg (28-32)  H  19  23:08    


 


MCHC  33 % (30-34)   19  23:08    


 


RDW  15.0 % (13.2-15.2)   19  23:08    


 


Plt Count  243 K/mm3 (140-440)   19  23:08    


 


Sodium  135 mmol/L (137-145)  L  19  23:08    


 


Potassium  5.7 mmol/L (3.6-5.0)  H  19  23:08    


 


Chloride  96.8 mmol/L ()  L  19  23:08    


 


Carbon Dioxide  21 mmol/L (22-30)  L  19  23:08    


 


Anion Gap  23 mmol/L  19  23:08    


 


BUN  98 mg/dL (7-17)  H  19  23:08    


 


Creatinine  11.6 mg/dL (0.7-1.2)  H  19  23:08    


 


Estimated GFR  3 ml/min  19  23:08    


 


BUN/Creatinine Ratio  8 %  19  23:08    


 


Glucose  147 mg/dL ()  H  19  23:08    


 


Calcium  8.1 mg/dL (8.4-10.2)  L  19  23:08    


 


Total Bilirubin  0.40 mg/dL (0.1-1.2)   19  23:08    


 


AST  38 units/L (5-40)   19  23:08    


 


ALT  35 units/L (7-56)   19  23:08    


 


Alkaline Phosphatase  76 units/L ()   19  23:08    


 


Troponin T  0.122 ng/mL (0.00-0.029)  H*  19  23:08    


 


NT-Pro-B Natriuret Pep  57360 pg/mL (0-900)  H  19  23:08    


 


Total Protein  7.3 g/dL (6.3-8.2)   19  23:08    


 


Albumin  4.0 g/dL (3.9-5)   19  23:08    


 


Albumin/Globulin Ratio  1.2 %  19  23:08    


 


Triglycerides  101 mg/dL (2-149)   19  23:08    


 


Cholesterol  131 mg/dL ()   19  23:08    


 


LDL Cholesterol Direct  45 mg/dL ()  L  19  23:08    


 


HDL Cholesterol  76 mg/dL (40-59)  H  19  23:08    


 


Cholesterol/HDL Ratio  1.72 %  19  23:08    














- Imaging and Cardiology


Chest x-ray: report reviewed





Assessment and Plan


Assessment and plan: 





Fluid overload in ESRD


-due to missed dialysis


-Nephrology consulted, pt to undergo HD today





Hypertensive urgency


-On antihypertensives, monitor BP





Elevated troponin


-Likely secondary to demand ischemia


-We'll continue serial troponin level monitoring


-Patient denies acute chest pain


-We will do echocardiogram to assess EF and valvular function





Hyperkalemia


-Expected to improve with dialysis





Metabolic acidosis


-2/2 CKD


-We'll monitor level





Hypothyroidism


-Continue Synthroid





Hyperlipidemia


-Continue statin





AOCD


-H/H stable





DVT prophylaxis with heparin

## 2019-10-01 NOTE — XRAY REPORT
CHEST 2 VIEWS 



INDICATION / CLINICAL INFORMATION:

SOB. Missed dialysis today.



COMPARISON: 

04/09/19



FINDINGS:



SUPPORT DEVICES: Right PermCath is unchanged.



HEART / MEDIASTINUM: Heart is mildly enlarged but stable. Mild pulmonary venous hypertension. 



LUNGS / PLEURA: Mild bibasilar pulmonary edema with small bilateral pleural effusions, right larger t
granados left. No pneumothorax. 



ADDITIONAL FINDINGS: No significant additional findings.



IMPRESSION:

1. Mild cardiomegaly with mild pulmonary edema and small pleural effusions.



Signer Name: BALBIR Harley MD 

Signed: 10/1/2019 12:42 AM

 Workstation Name: [id number]

## 2019-10-02 VITALS — SYSTOLIC BLOOD PRESSURE: 145 MMHG | DIASTOLIC BLOOD PRESSURE: 61 MMHG

## 2019-10-02 LAB
BUN SERPL-MCNC: 56 MG/DL (ref 7–17)
BUN/CREAT SERPL: 6 %
CALCIUM SERPL-MCNC: 8.3 MG/DL (ref 8.4–10.2)
HCT VFR BLD CALC: 28.4 % (ref 30.3–42.9)
HEMOLYSIS INDEX: 9
HGB BLD-MCNC: 9.7 GM/DL (ref 10.1–14.3)
MCHC RBC AUTO-ENTMCNC: 34 % (ref 30–34)
MCV RBC AUTO: 97 FL (ref 79–97)
PLATELET # BLD: 226 K/MM3 (ref 140–440)
RBC # BLD AUTO: 2.91 M/MM3 (ref 3.65–5.03)

## 2019-10-02 PROCEDURE — 5A1D70Z PERFORMANCE OF URINARY FILTRATION, INTERMITTENT, LESS THAN 6 HOURS PER DAY: ICD-10-PCS | Performed by: INTERNAL MEDICINE

## 2019-10-02 RX ADMIN — CALCIUM CARBONATE (ANTACID) CHEW TAB 500 MG SCH MG: 500 CHEW TAB at 08:06

## 2019-10-02 RX ADMIN — NIFEDIPINE SCH: 60 TABLET, EXTENDED RELEASE ORAL at 09:13

## 2019-10-02 RX ADMIN — HEPARIN SODIUM SCH UNIT: 5000 INJECTION, SOLUTION INTRAVENOUS; SUBCUTANEOUS at 07:27

## 2019-10-02 RX ADMIN — FUROSEMIDE SCH: 40 TABLET ORAL at 09:13

## 2019-10-02 RX ADMIN — INSULIN LISPRO SCH: 100 INJECTION, SOLUTION INTRAVENOUS; SUBCUTANEOUS at 13:13

## 2019-10-02 RX ADMIN — INSULIN LISPRO SCH UNIT: 100 INJECTION, SOLUTION INTRAVENOUS; SUBCUTANEOUS at 08:05

## 2019-10-02 RX ADMIN — CALCITRIOL SCH MCG: 0.5 CAPSULE, LIQUID FILLED ORAL at 09:15

## 2019-10-02 RX ADMIN — LEVOTHYROXINE SODIUM SCH MCG: 0.07 TABLET ORAL at 07:27

## 2019-10-02 NOTE — CONSULTATION
<ANNA PEREZ - Last Filed: 10/02/19 10:23>





History of Present Illness


Consult date: 10/02/19


Consult reason: elevated troponin


History of present illness: 





This is a 74-year old woman with hypertension, end stage renal disease, on 

hemodialysis who presented  with shortness of breath, admitted with volume 

overload secondary to missed dialysis. Chest x-ray showed mild cardiomegaly with

mild interstitial edema. 





A cardiac consultation has been requested for elevated troponin in the setting 

of renal failure. There are no reports of chest pain. An ECG is sinus rhythm, no

acute ischemic changes. Further evaluation with an echocardiogram reveals severe

pulmonary hypertension, PASP 81 mmHG. In addition, there is a decreased left 

ventricular systolic function, ejection fraction 35-40%. Findings are new when 

compared to an outpatient echocardiogram done 4 months ago that reports a normal

left ventricular ejection fraction, 50-55%. 





Today, patient is undergoing dialysis. She reports her breathing is better and 

lower extremity edema has resolved. Patient has no chest pain and she denies 

palpitations.





Past History


Past Medical History: diabetes, dialysis (M/W/F), ESRD, hypertension, 

hyperlipidemia, hypothyroidism


Past Surgical History:  (X2), thyroidectomy, Other (Vas-cath, AV graft 

in left arm, LT eye surgery)


Social history: other (patient admits to occasional alcohol use but denies 

tobacco or illicit drug use)


Family history: diabetes (mother), hypertension (mother, father)





Medications and Allergies


                                    Allergies











Allergy/AdvReac Type Severity Reaction Status Date / Time


 


No Known Allergies Allergy   Verified 19 22:48











                                Home Medications











 Medication  Instructions  Recorded  Confirmed  Last Taken  Type


 


Carvedilol [Coreg] 25 mg PO BID 04/08/19 10/01/19 09/30/19 History


 


Furosemide [Lasix TAB] 40 mg PO QDAY 04/08/19 10/01/19 09/30/19 History


 


Insulin Glargine,Hum.rec.anlog 14 units SQ DAILY 04/08/19 10/01/19 09/30/19 

History





[Lantus]     


 


Levothyroxine [Synthroid] 75 mcg PO QAM 04/08/19 10/01/19 09/30/19 History


 


Rosuvastatin Calcium 5 mg PO DAILY 04/08/19 10/01/19 09/30/19 History


 


glipiZIDE [Glipizide] 5 mg PO DAILY 04/08/19 10/01/19 09/30/19 History


 


Calcitriol [Rocaltrol] 0.5 mcg PO QDAY #30 capsule 04/11/19 10/01/19 Unknown Rx


 


Calcium Carbonate [Tums 500MG CHEW] 1,000 mg PO TID 30 Days  tablet 04/11/19 

10/01/19 Unknown Rx


 


NIFEdipine XL [Procardia Xl] 60 mg PO Q12HR #60 tablet 04/11/19 10/01/19 

09/30/19 Rx


 


Vitamin D3 1,000 UNIT TAB 2,000 units PO DAILY 10/01/19 10/01/19 Unknown History











Active Meds: 


Active Medications





Atorvastatin Calcium (Lipitor)  10 mg PO DAILY [facility]


   Last Admin: 10/02/19 09:15 Dose:  10 mg


   Documented by: 


Calcitriol (Rocaltrol)  0.5 mcg PO QDAY [facility]


   Last Admin: 10/02/19 09:15 Dose:  0.5 mcg


   Documented by: 


Calcium Carbonate/Glycine (Tums)  1,000 mg PO TID [facility]


   Last Admin: 10/02/19 08:06 Dose:  1,000 mg


   Documented by: 


Carvedilol (Coreg)  25 mg PO BID [facility]


   Last Admin: 10/02/19 09:15 Dose:  25 mg


   Documented by: 


Dextrose (D50w (25gm) Syringe)  50 ml IV PRN PRN


   PRN Reason: Hypoglycemia


Furosemide (Lasix)  40 mg PO QDAY [facility]


   Last Admin: 10/02/19 09:13 Dose:  Not Given


   Documented by: 


Heparin Sodium (Porcine) (Heparin)  5,000 unit SUB-Q Q8HR [facility]


   Last Admin: 10/02/19 07:27 Dose:  5,000 unit


   Documented by: 


Hydralazine HCl (Apresoline)  10 mg IV Q4HR PRN


   PRN Reason: Blood Pressure


Sodium Chloride (Nacl 0.9%)  100 mls @ 999 mls/hr IV MICHELLE PRN


   PRN Reason: Hypotension


Insulin Glargine (Lantus)  10 units SUB-Q QHS [facility]


   Last Admin: 10/01/19 21:57 Dose:  10 units


   Documented by: 


Insulin Human Lispro (Humalog)  0 unit SUB-Q ACHS [facility]; Protocol


   Last Admin: 10/02/19 08:05 Dose:  3 unit


   Documented by: 


Levothyroxine Sodium (Synthroid)  75 mcg PO QAM@0600 [facility]


   Last Admin: 10/02/19 07:27 Dose:  75 mcg


   Documented by: 


Nifedipine (Procardia Xl)  60 mg PO Q12HR [facility]


   Last Admin: 10/02/19 09:13 Dose:  Not Given


   Documented by: 











Physical Examination


                                   Vital Signs











Temp Pulse Resp BP Pulse Ox


 


 98.4 F   71   18   141/59   96 


 


 19 22:53  19 22:53  19 22:53  19 22:53  19 22:53











General appearance: no acute distress


HEENT: Positive: PERRL


Neck: Positive: trachea midline


Cardiac: Positive: Reg Rate and Rhythm


Lungs: Positive: Normal Breath Sounds


Neuro: Positive: Grossly Intact


Extremities: Absent: edema





Results





                                 10/02/19 05:53





                                 10/02/19 05:53


                                       CBC











  10/02/19 Range/Units





  05:53 


 


WBC  5.4  (4.5-11.0)  K/mm3


 


RBC  2.91 L  (3.65-5.03)  M/mm3


 


Hgb  9.7 L  (10.1-14.3)  gm/dl


 


Hct  28.4 L  (30.3-42.9)  %


 


Plt Count  226  (140-440)  K/mm3








                          Comprehensive Metabolic Panel











  10/02/19 Range/Units





  05:53 


 


Sodium  135 L  (137-145)  mmol/L


 


Potassium  5.0  (3.6-5.0)  mmol/L


 


Chloride  91.3 L  ()  mmol/L


 


Carbon Dioxide  29  D  (22-30)  mmol/L


 


BUN  56 H  (7-17)  mg/dL


 


Creatinine  8.7 H  (0.7-1.2)  mg/dL


 


Glucose  102 H  ()  mg/dL


 


Calcium  8.3 L  (8.4-10.2)  mg/dL














Assessment and Plan





Volume overload


Acute CHF exacerbation


   echocardiogram reveals severe pulmonary hypertension, PASP 81 mmHG. In 

addition, there 


   is a decreased left ventricular systolic function, ejection fraction 35-40%.


ESRD on HD


Hypertension


Diabetes


Elevated troponin


   in the setting of renal failure. Patient denies chest pain





Recommendations:


Dialysis for fluid removal.


Sodium/Fluid restriction.


Medical therapy for systolic heart failure.


We will defer ischemic cardiac evaluation to be done as an outpatient. Patient 

advised to follow up with 


her primary cardiologist, Dr Wheeler, within 3-5 days of discharge.





<ALEX GR - Last Filed: 10/02/19 11:15>





Medications and Allergies


Active Meds: 


Active Medications





Atorvastatin Calcium (Lipitor)  10 mg PO DAILY [facility]


   Last Admin: 10/02/19 09:15 Dose:  10 mg


   Documented by: 


Calcitriol (Rocaltrol)  0.5 mcg PO QDAY [facility]


   Last Admin: 10/02/19 09:15 Dose:  0.5 mcg


   Documented by: 


Calcium Carbonate/Glycine (Tums)  1,000 mg PO TID [facility]


   Last Admin: 10/02/19 08:06 Dose:  1,000 mg


   Documented by: 


Carvedilol (Coreg)  25 mg PO BID [facility]


   Last Admin: 10/02/19 09:15 Dose:  25 mg


   Documented by: 


Dextrose (D50w (25gm) Syringe)  50 ml IV PRN PRN


   PRN Reason: Hypoglycemia


Furosemide (Lasix)  40 mg PO QDAY [facility]


   Last Admin: 10/02/19 09:13 Dose:  Not Given


   Documented by: 


Heparin Sodium (Porcine) (Heparin)  5,000 unit SUB-Q Q8HR [facility]


   Last Admin: 10/02/19 07:27 Dose:  5,000 unit


   Documented by: 


Hydralazine HCl (Apresoline)  10 mg IV Q4HR PRN


   PRN Reason: Blood Pressure


Sodium Chloride (Nacl 0.9%)  100 mls @ 999 mls/hr IV MICHELLE PRN


   PRN Reason: Hypotension


Insulin Glargine (Lantus)  10 units SUB-Q QHS [facility]


   Last Admin: 10/01/19 21:57 Dose:  10 units


   Documented by: 


Insulin Human Lispro (Humalog)  0 unit SUB-Q [facility]; Protocol


   Last Admin: 10/02/19 08:05 Dose:  3 unit


   Documented by: 


Levothyroxine Sodium (Synthroid)  75 mcg PO QAM@0600 [facility]


   Last Admin: 10/02/19 07:27 Dose:  75 mcg


   Documented by: 


Lisinopril (Zestril)  40 mg PO QDAY [facility]


Nifedipine (Procardia Xl)  60 mg PO Q12HR [facility]


   Last Admin: 10/02/19 09:13 Dose:  Not Given


   Documented by: 











Physical Examination


                                   Vital Signs











Temp Pulse Resp BP Pulse Ox


 


 98.4 F   71   18   141/59   96 


 


 19 22:53  19 22:53  19 22:53  19 22:53  19 22:53














Results





                                 10/02/19 05:53





                                 10/02/19 05:53


                                       CBC











  10/02/19 Range/Units





  05:53 


 


WBC  5.4  (4.5-11.0)  K/mm3


 


RBC  2.91 L  (3.65-5.03)  M/mm3


 


Hgb  9.7 L  (10.1-14.3)  gm/dl


 


Hct  28.4 L  (30.3-42.9)  %


 


Plt Count  226  (140-440)  K/mm3








                          Comprehensive Metabolic Panel











  10/02/19 Range/Units





  05:53 


 


Sodium  135 L  (137-145)  mmol/L


 


Potassium  5.0  (3.6-5.0)  mmol/L


 


Chloride  91.3 L  ()  mmol/L


 


Carbon Dioxide  29  D  (22-30)  mmol/L


 


BUN  56 H  (7-17)  mg/dL


 


Creatinine  8.7 H  (0.7-1.2)  mg/dL


 


Glucose  102 H  ()  mg/dL


 


Calcium  8.3 L  (8.4-10.2)  mg/dL














Assessment and Plan


Has seen and evaluated the patient and agree with the assessment and plan.  The 

patient has been removed for volume overload secondary to missed dialysis.  

During the patient's evaluation echocardiogram was done which showed a newly 

decreased left ventricular systolic function with ejection fraction of 35-40% as

well as severe pulmonary hypertension with a SRAM pulmonary arterial pressure of

81 mmHg.  At this time patient will be started on goal-directed medical therapy.

 Patient will require further ischemic evaluation given new onset LV dysfunction

but this may be done as an outpatient.

## 2019-10-02 NOTE — CONSULTATION
Past History


Past Medical History: diabetes, dialysis (M/W/F), ESRD, hypertension, 

hyperlipidemia, hypothyroidism


Past Surgical History:  (X2), thyroidectomy, Other (Vas-cath, AV graft 

in left arm, LT eye surgery)


Social history: other (patient admits to occasional alcohol use but denies 

tobacco or illicit drug use)


Family history: diabetes (mother), hypertension (mother, father)





Medications and Allergies


                                    Allergies











Allergy/AdvReac Type Severity Reaction Status Date / Time


 


No Known Allergies Allergy   Verified 19 22:48











                                Home Medications











 Medication  Instructions  Recorded  Confirmed  Last Taken  Type


 


Carvedilol [Coreg] 25 mg PO BID 04/08/19 10/01/19 09/30/19 History


 


Furosemide [Lasix TAB] 40 mg PO QDAY 04/08/19 10/01/19 09/30/19 History


 


Insulin Glargine,Hum.rec.anlog 14 units SQ DAILY 04/08/19 10/01/19 09/30/19 

History





[Lantus]     


 


Levothyroxine [Synthroid] 75 mcg PO QAM 04/08/19 10/01/19 09/30/19 History


 


Rosuvastatin Calcium 5 mg PO DAILY 04/08/19 10/01/19 09/30/19 History


 


glipiZIDE [Glipizide] 5 mg PO DAILY 04/08/19 10/01/19 09/30/19 History


 


Calcitriol [Rocaltrol] 0.5 mcg PO QDAY #30 capsule 04/11/19 10/01/19 Unknown Rx


 


Calcium Carbonate [Tums 500MG CHEW] 1,000 mg PO TID 30 Days  tablet 04/11/19 

10/01/19 Unknown Rx


 


NIFEdipine XL [Procardia Xl] 60 mg PO Q12HR #60 tablet 04/11/19 10/01/19 

09/30/19 Rx


 


Vitamin D3 1,000 UNIT TAB 2,000 units PO DAILY 10/01/19 10/01/19 Unknown History


 


Lisinopril [Zestril TAB] 40 mg PO QDAY #30 tablet 10/02/19  Unknown Rx











Active Meds: 


Active Medications





Atorvastatin Calcium (Lipitor)  10 mg PO DAILY [facility]


   Last Admin: 10/02/19 09:15 Dose:  10 mg


   Documented by: 


Calcitriol (Rocaltrol)  0.5 mcg PO QDAY [facility]


   Last Admin: 10/02/19 09:15 Dose:  0.5 mcg


   Documented by: 


Calcium Carbonate/Glycine (Tums)  1,000 mg PO TID [facility]


   Last Admin: 10/02/19 08:06 Dose:  1,000 mg


   Documented by: 


Carvedilol (Coreg)  25 mg PO BID [facility]


   Last Admin: 10/02/19 09:15 Dose:  25 mg


   Documented by: 


Dextrose (D50w (25gm) Syringe)  50 ml IV PRN PRN


   PRN Reason: Hypoglycemia


Furosemide (Lasix)  40 mg PO QDAY [facility]


   Last Admin: 10/02/19 09:13 Dose:  Not Given


   Documented by: 


Heparin Sodium (Porcine) (Heparin)  5,000 unit SUB-Q Q8HR [facility]


   Last Admin: 10/02/19 07:27 Dose:  5,000 unit


   Documented by: 


Hydralazine HCl (Apresoline)  10 mg IV Q4HR PRN


   PRN Reason: Blood Pressure


Sodium Chloride (Nacl 0.9%)  100 mls @ 999 mls/hr IV MICHELLE PRN


   PRN Reason: Hypotension


Insulin Glargine (Lantus)  10 units SUB-Q QHS [facility]


   Last Admin: 10/01/19 21:57 Dose:  10 units


   Documented by: 


Insulin Human Lispro (Humalog)  0 unit SUB-Q [facility]; Protocol


   Last Admin: 10/02/19 13:13 Dose:  Not Given


   Documented by: 


Levothyroxine Sodium (Synthroid)  75 mcg PO QAM@0600 [facility]


   Last Admin: 10/02/19 07:27 Dose:  75 mcg


   Documented by: 


Lisinopril (Zestril)  40 mg PO QDAY [facility]


Nifedipine (Procardia Xl)  60 mg PO Q12HR [facility]


   Last Admin: 10/02/19 09:13 Dose:  Not Given


   Documented by: 











Exam





- Vital Signs


Vital signs: 


                                   Vital Signs











Temp Pulse Resp BP Pulse Ox


 


 98.4 F   71   18   141/59   96 


 


 19 22:53  19 22:53  19 22:53  19 22:53  19 22:53














Results





- Lab Results





                                 10/02/19 05:53





                                 10/02/19 05:53


                             Most recent lab results











Calcium  8.3 mg/dL (8.4-10.2)  L  10/02/19  05:53    














Assessment and Plan





- Patient Problems


(1) ESRD needing dialysis


Current Visit: Yes   Status: Acute   





(2) HTN (hypertension)


Current Visit: No   Status: Acute   


Qualifiers: 


   Hypertension type: essential hypertension   Qualified Code(s): I10 - 

Essential (primary) hypertension   





(3) Diabetes


Current Visit: No   Status: Acute   





(4) Hyperkalemia


Current Visit: Yes   Status: Acute   





(5) Volume overload


Current Visit: Yes   Status: Acute

## 2019-10-02 NOTE — PROGRESS NOTE
Assessment and Plan





- Patient Problems


(1) ESRD needing dialysis


Current Visit: Yes   Status: Acute   


Plan to address problem: 


ESRD on hemodialysis 


- Received hemodialysis  x 2 since admission. 


- access: left arm AVF 


- has Right IJ cath which is scheduled to be removed. 








(2) HTN (hypertension)


Current Visit: No   Status: Acute   


Qualifiers: 


   Hypertension type: essential hypertension   Qualified Code(s): I10 - 

Essential (primary) hypertension   


Plan to address problem: 


HTN:  uncontrolled. 


Ensure medications 


monitor Blood pressure 








(3) Diabetes


Current Visit: No   Status: Acute   


Plan to address problem: 


DM type II :  uncontrolled. 


Ensure medications 


Monitor blood glucose. 








(4) Hyperkalemia


Current Visit: Yes   Status: Acute   


Plan to address problem: 


Hyperkalemia : 


Received hemodialysis 


Recheck labs. 








(5) Volume overload


Current Visit: Yes   Status: Acute   


Plan to address problem: 


Volume overload : 


- CXR with edema 


- Received HD again today. 








Subjective


Interval history: 





74 year old with medical history significant for HTN, DM type II longstanding 

for about 20 years , ESRD on hemodialysis via a Left arm AVF on Monday, 

Wednesday and Friday, presenting with complaints of shortness of breath 

following missed dialysis . She does not have a reason for missing her dialysis 

treatment and apparently just decided not to go to her scheduled treatment. 

There is associated exertional dypsnea , orthopnea , PND. Patient denies any 

associated fevers or chills.Denies any lower extremity edema.  





Patient seen today had dialysis today 


only 2L removed by nurse 


remains on supplemental oxygen though oxygen saturation improved to 95% 


was seen by cardiology today. 








Objective





- Vital Signs


Vital signs: 


                               Vital Signs - 12hr











  10/02/19 10/02/19 10/02/19





  07:11 09:35 09:45


 


Temperature 98.4 F 97.8 F 


 


Pulse Rate 64 76 72


 


Pulse Rate [   





Left Radial]   


 


Respiratory 18 18 





Rate   


 


Blood Pressure 132/57 91/76 150/71


 


O2 Sat by Pulse 91  





Oximetry   














  10/02/19 10/02/19 10/02/19





  10:00 10:15 10:30


 


Temperature   


 


Pulse Rate 72 69 66


 


Pulse Rate [ 64  





Left Radial]   


 


Respiratory 20  





Rate   


 


Blood Pressure 151/73 155/69 147/67


 


O2 Sat by Pulse   





Oximetry   














  10/02/19 10/02/19 10/02/19





  10:45 11:00 11:15


 


Temperature   


 


Pulse Rate 66 62 64


 


Pulse Rate [   





Left Radial]   


 


Respiratory   





Rate   


 


Blood Pressure 142/67 143/66 150/65


 


O2 Sat by Pulse   





Oximetry   














  10/02/19 10/02/19 10/02/19





  11:30 11:45 12:00


 


Temperature   


 


Pulse Rate 62 62 66


 


Pulse Rate [   





Left Radial]   


 


Respiratory   





Rate   


 


Blood Pressure 134/63 146/68 161/75


 


O2 Sat by Pulse   





Oximetry   














  10/02/19 10/02/19





  12:15 12:30


 


Temperature  


 


Pulse Rate 66 64


 


Pulse Rate [  





Left Radial]  


 


Respiratory  





Rate  


 


Blood Pressure 152/70 160/73


 


O2 Sat by Pulse  





Oximetry  














- General Appearance


General appearance: well-developed, well-nourished


EENT: ATNC, PERRL, mucous membranes moist


Neck: no JVD


Respiratory: Present: Decreased Breath Sounds


Cardiology: regular, S1S2


Gastrointestinal: normal, normoactive bowel sounds


Integumentary: no rash


Neurologic: alert and oriented x3, CN 3-12 intact





- Lab





                                 10/02/19 05:53





                                 10/02/19 05:53


                             Most recent lab results











Calcium  8.3 mg/dL (8.4-10.2)  L  10/02/19  05:53    














- Imaging


Chest x-ray: image reviewed (I reviewed CXR with hazy bilateral opacities. )





Medications & Allergies





- Medications


Allergies/Adverse Reactions: 


                                    Allergies





No Known Allergies Allergy (Verified 09/30/19 22:48)


   








Home Medications: 


                                Home Medications











 Medication  Instructions  Recorded  Confirmed  Last Taken  Type


 


Carvedilol [Coreg] 25 mg PO BID 04/08/19 10/01/19 09/30/19 History


 


Furosemide [Lasix TAB] 40 mg PO QDAY 04/08/19 10/01/19 09/30/19 History


 


Insulin Glargine,Hum.rec.anlog 14 units SQ DAILY 04/08/19 10/01/19 09/30/19 

History





[Lantus]     


 


Levothyroxine [Synthroid] 75 mcg PO QAM 04/08/19 10/01/19 09/30/19 History


 


Rosuvastatin Calcium 5 mg PO DAILY 04/08/19 10/01/19 09/30/19 History


 


glipiZIDE [Glipizide] 5 mg PO DAILY 04/08/19 10/01/19 09/30/19 History


 


Calcitriol [Rocaltrol] 0.5 mcg PO QDAY #30 capsule 04/11/19 10/01/19 Unknown Rx


 


Calcium Carbonate [Tums 500MG CHEW] 1,000 mg PO TID 30 Days  tablet 04/11/19 

10/01/19 Unknown Rx


 


NIFEdipine XL [Procardia Xl] 60 mg PO Q12HR #60 tablet 04/11/19 10/01/19 

09/30/19 Rx


 


Vitamin D3 1,000 UNIT TAB 2,000 units PO DAILY 10/01/19 10/01/19 Unknown History


 


Lisinopril [Zestril TAB] 40 mg PO QDAY #30 tablet 10/02/19  Unknown Rx











Active Medications: 














Generic Name Dose Route Start Last Admin





  Trade Name Freq  PRN Reason Stop Dose Admin


 


Atorvastatin Calcium  10 mg  10/01/19 10:00  10/02/19 09:15





  Lipitor  PO   10 mg





  DAILY MARK   Administration


 


Calcitriol  0.5 mcg  10/01/19 10:00  10/02/19 09:15





  Rocaltrol  PO   0.5 mcg





  QDAY MARK   Administration


 


Calcium Carbonate/Glycine  1,000 mg  10/01/19 08:00  10/02/19 08:06





  Tums  PO   1,000 mg





  TID MARK   Administration


 


Carvedilol  25 mg  10/01/19 10:00  10/02/19 09:15





  Coreg  PO   25 mg





  BID MARK   Administration


 


Dextrose  50 ml  10/01/19 03:42 





  D50w (25gm) Syringe  IV  





  PRN PRN  





  Hypoglycemia  


 


Furosemide  40 mg  10/01/19 10:00  10/02/19 09:13





  Lasix  PO   Not Given





  QDAY MARK  


 


Heparin Sodium (Porcine)  5,000 unit  10/01/19 06:00  10/02/19 07:27





  Heparin  SUB-Q   5,000 unit





  Q8HR MARK   Administration


 


Hydralazine HCl  10 mg  10/01/19 03:24 





  Apresoline  IV  





  Q4HR PRN  





  Blood Pressure  


 


Sodium Chloride  100 mls @ 999 mls/hr  10/01/19 03:03 





  Nacl 0.9%  IV  





  MICHELLE PRN  





  Hypotension  


 


Insulin Glargine  10 units  10/01/19 22:00  10/01/19 21:57





  Lantus  SUB-Q   10 units





  [facility] MARK   Administration


 


Insulin Human Lispro  0 unit  10/01/19 07:30  10/02/19 13:13





  Humalog  SUB-Q   Not Given





  [facility]  





  Protocol  


 


Levothyroxine Sodium  75 mcg  10/01/19 06:00  10/02/19 07:27





  Synthroid  PO   75 mcg





  QAM@0600 MARK   Administration


 


Lisinopril  40 mg  10/03/19 10:00 





  Zestril  PO  





  QDAY MARK  


 


Nifedipine  60 mg  10/01/19 10:00  10/02/19 09:13





  Procardia Xl  PO   Not Given





  Q12HR MARK

## 2019-10-02 NOTE — XRAY REPORT
CHEST 1 VIEW 



INDICATION:  Follow-up CHF, shortness of breath.



COMPARISON:  9/30/2019



FINDINGS:

Support devices: Right IJ dual-lumen catheter remains in good position terminating in the superior ri
ght atrium. 



Heart: Mild cardiomegaly is stable 

Lungs/Pleura: Decreased pulmonary venous congestion and near resolution of small bilateral pleural ef
fusions. No acute airspace disease or pneumothorax. 



Additional findings: None.



IMPRESSION:

 Near resolution of CHF findings since 9/30/2019.



Signer Name: Holden Short Jr, MD 

Signed: 10/2/2019 7:50 AM

 Workstation Name: [id number]

## 2019-10-02 NOTE — DISCHARGE SUMMARY
Providers





- Providers


Date of Admission: 


10/01/19 14:45





Attending physician: 


SANTO BACK MD





                                        





10/01/19 00:47


Consult to Physician [CONS] Urgent 


   Comment: 


   Consulting Provider: SHAN PEREZ


   Physician Instructions: 


   Reason For Exam: ESRD, Hyperkalemia





10/01/19 11:37


Physical Therapy Evaluation and Treat [CONS] Routine 


   Comment: 


   Reason For Exam: Weakness





10/01/19 12:04


Consult to Physician [CONS] Routine 


   Comment: 


   Consulting Provider: PETEY NICOLE


   Physician Instructions: 


   Reason For Exam: type 2 mi











Primary care physician: 


PRIMARY CARE MD








Hospitalization


Reason for admission: VOLUME OVERLOAD


Condition: Stable


Hospital course: 


Patient is a 74-year-old  female with history of ESRD on HD who presented 

to the ED on account of few hours history of worsening sob.  She stated that she

 missed her dialysis today because she had other appointments.  She has 

associated cough productive of whitish sputum and bilateral leg swelling.  She 

denies chest pain, palpitation, diaphoresis, fever, chills, headaches, nausea, 

vomiting, lightheadedness, syncope or LOC.  No abdominal pain, constipation or 

diarrhea.





Acute diastolic congestive heart failure


-due to missed dialysis


-Improved following diuresis with dialysis


-Status of counseling about diuresis and dialysis compliance


-Nephrology consulted, pt to undergo HD today


-Outpatient ischemic workup





Severe pulmonary hypertension


-Pulmonary evaluation outpatient and outpatient ischemic workup





Hypertensive urgency


-On antihypertensives, monitor BP





Elevated troponin


-Cardiology input noted, outpatient ischemic workup





Hyperkalemia


-Expected to improve with dialysis





Metabolic acidosis


-2/2 CKD


-We'll monitor level





Hypothyroidism


-Continue Synthroid





Hyperlipidemia


-Continue statin





AOCD


-H/H stable








Disposition: DC-01 TO HOME OR SELFCARE


Time spent for discharge: 35 MINS





Core Measure Documentation





- Palliative Care


Palliative Care/ Comfort Measures: Not Applicable





- Core Measures


Any of the following diagnoses?: none





Exam





- Physical Exam


Narrative exam: 


VITAL SIGNS:  Reviewed.    


GENERAL:  The patient appears normally developed, Vital signs as documented.


HEAD:  No signs of head trauma.


EYES:  Pupils are equal.  Extraocular motions intact.  


EARS:  Hearing grossly intact.


MOUTH:  Oropharynx is normal. 


NECK:  No adenopathy, no JVD.  


CHEST:  Chest with clear breath sounds bilaterally.  No wheezes, rales, or 

rhonchi.  


CARDIAC:  Regular rate and rhythm.  S1 and S2, without murmurs, gallops, or 

rubs.


VASCULAR:  Trace Edema.  Peripheral pulses normal and equal in all extremities.


ABDOMEN:  Soft, non tender and non distended.  No   rebound or guarding, and no 

masses palpated.   Bowel Sounds normal.


MUSCULOSKELETAL:  Good range of motion of all major joints. Extremities without 

clubbing, cyanosis. Trace edema.  


NEUROLOGIC EXAM:  Alert and oriented x 3   No focal sensory or strength 

deficits.   Speech normal.  Follows commands.


PSYCHIATRIC:  Mood normal.


SKIN:  detail exam as documented in skin assessment








- Constitutional


Vitals: 


                                        











Temp Pulse Resp BP Pulse Ox


 


 97.8 F   64   20   150/65   91 


 


 10/02/19 09:35  10/02/19 11:15  10/02/19 10:00  10/02/19 11:15  10/02/19 07:11














Plan


Activity: advance as tolerated, fall precautions


Diet: low fat, low salt, renal


Special Instructions: restrict fluid intake to (1200cc/day), record daily 

weights, record daily BP diary


Follow up with: 


PRIMARY CARE,MD [Primary Care Provider] - 7 Days


SONNY MEHTA MD [Staff Physician] - 7 Days


BHARAT NORMAN MD [Staff Physician] - 7 Days


PHIL ALEXANDER MD [Staff Physician] - 7 Days


Prescriptions: 


Lisinopril [Zestril TAB] 40 mg PO QDAY #30 tablet

## 2019-10-22 ENCOUNTER — HOSPITAL ENCOUNTER (OUTPATIENT)
Dept: HOSPITAL 5 - ED | Age: 74
Setting detail: OBSERVATION
LOS: 1 days | Discharge: HOME | End: 2019-10-23
Attending: INTERNAL MEDICINE | Admitting: INTERNAL MEDICINE
Payer: MEDICARE

## 2019-10-22 DIAGNOSIS — Z98.891: ICD-10-CM

## 2019-10-22 DIAGNOSIS — I12.0: ICD-10-CM

## 2019-10-22 DIAGNOSIS — R55: Primary | ICD-10-CM

## 2019-10-22 DIAGNOSIS — Z79.4: ICD-10-CM

## 2019-10-22 DIAGNOSIS — N18.6: ICD-10-CM

## 2019-10-22 DIAGNOSIS — R74.8: ICD-10-CM

## 2019-10-22 DIAGNOSIS — E11.22: ICD-10-CM

## 2019-10-22 LAB
ALBUMIN SERPL-MCNC: 3.9 G/DL (ref 3.9–5)
ALT SERPL-CCNC: 82 UNITS/L (ref 7–56)
APTT BLD: 37.3 SEC. (ref 24.2–36.6)
BASOPHILS # (AUTO): 0.1 K/MM3 (ref 0–0.1)
BASOPHILS NFR BLD AUTO: 1.3 % (ref 0–1.8)
BUN SERPL-MCNC: 33 MG/DL (ref 7–17)
BUN/CREAT SERPL: 5 %
CALCIUM SERPL-MCNC: 8.7 MG/DL (ref 8.4–10.2)
EOSINOPHIL # BLD AUTO: 0.1 K/MM3 (ref 0–0.4)
EOSINOPHIL NFR BLD AUTO: 2.2 % (ref 0–4.3)
HCT VFR BLD CALC: 29.1 % (ref 30.3–42.9)
HDLC SERPL-MCNC: 72 MG/DL (ref 40–59)
HEMOLYSIS INDEX: 25
HGB BLD-MCNC: 9.4 GM/DL (ref 10.1–14.3)
INR PPP: 1.06 (ref 0.87–1.13)
LYMPHOCYTES # BLD AUTO: 0.6 K/MM3 (ref 1.2–5.4)
LYMPHOCYTES NFR BLD AUTO: 12 % (ref 13.4–35)
MCHC RBC AUTO-ENTMCNC: 32 % (ref 30–34)
MCV RBC AUTO: 101 FL (ref 79–97)
MONOCYTES # (AUTO): 0.4 K/MM3 (ref 0–0.8)
MONOCYTES % (AUTO): 8.4 % (ref 0–7.3)
PLATELET # BLD: 221 K/MM3 (ref 140–440)
RBC # BLD AUTO: 2.88 M/MM3 (ref 3.65–5.03)

## 2019-10-22 PROCEDURE — 85610 PROTHROMBIN TIME: CPT

## 2019-10-22 PROCEDURE — 36415 COLL VENOUS BLD VENIPUNCTURE: CPT

## 2019-10-22 PROCEDURE — 93010 ELECTROCARDIOGRAM REPORT: CPT

## 2019-10-22 PROCEDURE — 93460 R&L HRT ART/VENTRICLE ANGIO: CPT

## 2019-10-22 PROCEDURE — C1760 CLOSURE DEV, VASC: HCPCS

## 2019-10-22 PROCEDURE — 80061 LIPID PANEL: CPT

## 2019-10-22 PROCEDURE — 70450 CT HEAD/BRAIN W/O DYE: CPT

## 2019-10-22 PROCEDURE — G0378 HOSPITAL OBSERVATION PER HR: HCPCS

## 2019-10-22 PROCEDURE — 71275 CT ANGIOGRAPHY CHEST: CPT

## 2019-10-22 PROCEDURE — 84484 ASSAY OF TROPONIN QUANT: CPT

## 2019-10-22 PROCEDURE — 78582 LUNG VENTILAT&PERFUS IMAGING: CPT

## 2019-10-22 PROCEDURE — 82962 GLUCOSE BLOOD TEST: CPT

## 2019-10-22 PROCEDURE — 80053 COMPREHEN METABOLIC PANEL: CPT

## 2019-10-22 PROCEDURE — 83036 HEMOGLOBIN GLYCOSYLATED A1C: CPT

## 2019-10-22 PROCEDURE — 86140 C-REACTIVE PROTEIN: CPT

## 2019-10-22 PROCEDURE — 85730 THROMBOPLASTIN TIME PARTIAL: CPT

## 2019-10-22 PROCEDURE — A9540 TC99M MAA: HCPCS

## 2019-10-22 PROCEDURE — 71045 X-RAY EXAM CHEST 1 VIEW: CPT

## 2019-10-22 PROCEDURE — 85025 COMPLETE CBC W/AUTO DIFF WBC: CPT

## 2019-10-22 PROCEDURE — 83880 ASSAY OF NATRIURETIC PEPTIDE: CPT

## 2019-10-22 PROCEDURE — 85652 RBC SED RATE AUTOMATED: CPT

## 2019-10-22 PROCEDURE — 85379 FIBRIN DEGRADATION QUANT: CPT

## 2019-10-22 PROCEDURE — 99284 EMERGENCY DEPT VISIT MOD MDM: CPT

## 2019-10-22 PROCEDURE — A9558 XE133 XENON 10MCI: HCPCS

## 2019-10-22 PROCEDURE — 93005 ELECTROCARDIOGRAM TRACING: CPT

## 2019-10-22 PROCEDURE — 80048 BASIC METABOLIC PNL TOTAL CA: CPT

## 2019-10-22 PROCEDURE — 96372 THER/PROPH/DIAG INJ SC/IM: CPT

## 2019-10-22 NOTE — XRAY REPORT
CHEST 1 VIEW 



INDICATION:  Chest Pain.



COMPARISON:  10/2/2019



FINDINGS:

Support devices: Right IJ permacath has been removed 



Heart: Stable mild cardiomegaly 

Lungs/Pleura: Relatively stable mild central pulmonary venous congestion. Trace right pleural effusio
n is suspected. No consolidation or pneumothorax. 



Additional findings: None.



IMPRESSION:

 Mild volume overload



Signer Name: Holden Short Jr, MD 

Signed: 10/22/2019 10:56 AM

 Workstation Name: [id number]

## 2019-10-22 NOTE — CAT SCAN REPORT
CT HEAD WITHOUT CONTRAST



INDICATION / CLINICAL INFORMATION:  headache, weakness, syncope.



TECHNIQUE: Axial imaging performed from the skull apex through the skull base without the use of cont
rast.  Sagittal and coronal reformatted images.  All CT scans at this location are performed using CT
 dose reduction for ALARA by means of automated exposure control. 



COMPARISON: None available.



FINDINGS:



CEREBRAL PARENCHYMA: No significant abnormality. No acute territorial infarct. Mild chronic microvasc
ular ischemic disease is noted in the white matter which appears appropriate for this patient's age. 
No chronic infarct.

HEMORRHAGE: None.

EXTRA-AXIAL SPACES: Normal in size and morphology for the patient's age.

VENTRICULAR SYSTEM: Normal in size and morphology for the patient's age.

MIDLINE SHIFT OR HERNIATION: None.



CEREBELLUM / BRAINSTEM: No significant abnormality.



CALVARIUM: No significant abnormality.

ORBITS: Normal as visualized.

PARANASAL SINUSES / MASTOID AIR CELLS: Normal as visualized.

SOFT TISSUES of HEAD: No significant abnormality.



ADDITIONAL FINDINGS: None.



IMPRESSION:

Unremarkable CT brain for age. Mild nonspecific chronic white matter changes.



Signer Name: Holden Short Jr, MD 

Signed: 10/22/2019 11:58 AM

 Workstation Name: [id number]

## 2019-10-22 NOTE — NUCLEAR MEDICINE REPORT
NUCLEAR MEDICINE VENTILATION/PERFUSION LUNG SCAN



INDICATION / CLINICAL INFORMATION:

syncope, + dimer. Shortness of breath and chest pain



TECHNIQUE:

21 mCi of Xe-133 were given by inhalation. 

5.1 mCi of Tc-99m MAA were given by IV.



COMPARISON:

Chest radiograph dated 10/22/2019.



FINDINGS:

VENTILATION: No significant ventilation defects.



PERFUSION: No significant perfusion defects.



ADDITIONAL FINDINGS: None.



IMPRESSION:

 Low probability for pulmonary embolism.



Signer Name: Holden Short Jr, MD 

Signed: 10/22/2019 3:03 PM

 Workstation Name: [id number]

## 2019-10-22 NOTE — EVENT NOTE
Date: 10/22/19





Please refer to today's cardiology office note.





Syncope


Dilated Cardiomyopathy


   echocardiogram done 10/1/19 reveals severe pulmonary hypertension, PASP 81 

mmHG. In addition, there 


   is a decreased left ventricular systolic function, ejection fraction 35-40%.


ESRD on HD (M/W/F)


Hypertension


Diabetes





Recommendations:


Sodium/Fluid restriction.


Rule out PE with a ventilation perfusion scan.


Medical therapy for dilated cardiomyopathy as tolerated.


Ischemic cardiac evaluation with a right and left cardiac cath for tomorrow 

morning and undergo her routine hemodialysis after the cardiac cath.

## 2019-10-22 NOTE — EMERGENCY DEPARTMENT REPORT
ED Syncope HPI





- General


Chief Complaint: Dyspnea/Respdistress


Stated Complaint: SYNCOPE


Time Seen by Provider: 10/22/19 10:01





- History of Present Illness


Initial Comments: 





74-year-old  female with history of ESRD on dialysis, hypertension, and 

diabetes presents to the ED with complaints of dizziness and syncopal episode x 

prior to arrival.  Patient states she was walking up the stairs to her 

cardiologist's office, Dr. Wheeler, and suddenly felt dizzy and passed out for a 

few seconds.  Her  states he caught her and she did not hit her head or 

pass out.  Patient denies any shortness of breath or chest pain.  She states she

has been having intermittent mild to moderate headaches for the past 2 days and 

states this is abnormal for her.  Denies worst headache of her life.  States 

upon waking this morning she felt dizzy and had weakness that was generalized.  

She denies any numbness/tingling, vision changes, swelling in her legs or pain, 

recent long travel, or history of MI/CVA/DVT/PE.  Patient was admitted to the 

hospital 10/01/2019 for shortness of breath and states her period of 2 days.  

Patient's nephrologist is Dr. Connors and Dr. Allison.  Patient states 

her last dialysis was yesterday.  She denies any current headache or weakness


Timing/Prior Episodes: single episode today


Precipitating Factors: Positive: lightheadedness


Context: activity


Loss of Consciousness: brief (seconds)





- Related Data


Allergies/Adverse Reactions: 


Allergies





No Known Allergies Allergy (Verified 19 22:48)


   








Home Medications: 


Ambulatory Orders





Carvedilol [Coreg] 25 mg PO BID 19 


Furosemide [Lasix TAB] 40 mg PO QDAY 19 


Insulin Glargine,Hum.rec.anlog [Lantus] 14 units SQ DAILY 19 


Levothyroxine [Synthroid] 75 mcg PO QAM 19 


Rosuvastatin Calcium 5 mg PO DAILY 19 


glipiZIDE [Glipizide] 5 mg PO DAILY 19 


Calcitriol [Rocaltrol] 0.5 mcg PO QDAY #30 capsule 19 


Calcium Carbonate [Tums 500MG CHEW] 1,000 mg PO TID 30 Days  tablet 19 


NIFEdipine XL [Procardia Xl] 60 mg PO Q12HR #60 tablet 19 


Vitamin D3 1,000 UNIT TAB 2,000 units PO DAILY 10/01/19 


Lisinopril [Zestril TAB] 40 mg PO QDAY #30 tablet 10/02/19 











ED Review of Systems


ROS: 


Stated complaint: SYNCOPE


Other details as noted in HPI





Constitutional: denies: chills, fever


Eyes: denies: eye pain, vision change


ENT: denies: ear pain, throat pain


Respiratory: denies: cough, shortness of breath, SOB with exertion, SOB at rest,

wheezing


Cardiovascular: denies: chest pain, palpitations


Endocrine: no symptoms reported


Gastrointestinal: denies: abdominal pain, nausea, diarrhea


Genitourinary: denies: urgency, dysuria, discharge


Musculoskeletal: denies: back pain, joint swelling, arthralgia


Skin: denies: rash, lesions


Neurological: weakness.  denies: numbness, paresthesias, abnormal gait, vertigo


Psychiatric: denies: anxiety, depression


Hematological/Lymphatic: denies: easy bleeding, easy bruising





ED Past Medical Hx





- Past Medical History


Hx Hypertension: Yes


Hx Diabetes: Yes


Hx Renal Disease: Yes





- Surgical History


Additional Surgical History:   x2.  Vas cath.  graft to left arm.  left

eye.  goiter removed





- Social History


Smoking Status: Never Smoker


Substance Use Type: Alcohol





- Medications


Home Medications: 


                                Home Medications











 Medication  Instructions  Recorded  Confirmed  Last Taken  Type


 


Carvedilol [Coreg] 25 mg PO BID 04/08/19 10/01/19 09/30/19 History


 


Furosemide [Lasix TAB] 40 mg PO QDAY 04/08/19 10/01/19 09/30/19 History


 


Insulin Glargine,Hum.rec.anlog 14 units SQ DAILY 04/08/19 10/01/19 09/30/19 

History





[Lantus]     


 


Levothyroxine [Synthroid] 75 mcg PO QAM 04/08/19 10/01/19 09/30/19 History


 


Rosuvastatin Calcium 5 mg PO DAILY 04/08/19 10/01/19 09/30/19 History


 


glipiZIDE [Glipizide] 5 mg PO DAILY 04/08/19 10/01/19 09/30/19 History


 


Calcitriol [Rocaltrol] 0.5 mcg PO QDAY #30 capsule 04/11/19 10/01/19 Unknown Rx


 


Calcium Carbonate [Tums 500MG CHEW] 1,000 mg PO TID 30 Days  tablet 04/11/19 

10/01/19 Unknown Rx


 


NIFEdipine XL [Procardia Xl] 60 mg PO Q12HR #60 tablet 04/11/19 10/01/19 

09/30/19 Rx


 


Vitamin D3 1,000 UNIT TAB 2,000 units PO DAILY 10/01/19 10/01/19 Unknown History


 


Lisinopril [Zestril TAB] 40 mg PO QDAY #30 tablet 10/02/19  Unknown Rx














ED Physical Exam





- General


Limitations: No Limitations


General appearance: alert, in no apparent distress





- Head


Head exam: Present: atraumatic, normocephalic





- Eye


Eye exam: Present: normal appearance, PERRL, EOMI.  Absent: scleral icterus





- ENT


ENT exam: Present: normal exam





- Neck


Neck exam: Present: normal inspection





- Respiratory


Respiratory exam: Present: normal lung sounds bilaterally.  Absent: respiratory 

distress, wheezes, rales, rhonchi





- Cardiovascular


Cardiovascular Exam: Present: regular rate, normal rhythm, normal heart sounds. 

Absent: rubs, gallop





- GI/Abdominal


GI/Abdominal exam: Present: soft, normal bowel sounds.  Absent: distended, 

tenderness





- Rectal


Rectal exam: Present: deferred





- Extremities Exam


Extremities exam: Present: normal inspection, full ROM.  Absent: tenderness, 

calf tenderness





- Back Exam


Back exam: Present: normal inspection





- Neurological Exam


Neurological exam: Present: alert, oriented X3, CN II-XII intact, normal gait, 

motor sensory deficit





- Expanded Neurological Exam


  ** Expanded


Speech: Present: fluid speech


Cranial nerves: EOM's Intact: Normal, Tongue Deviation: Normal, Facial 

Sensation: Normal


Cerebellar function: Finger to Nose: Normal, Heel to Shin: Normal, Romberg: 

Normal


Sensory exam: Upper Extremity Light Touch: Normal, Lower Extremity Light Touch: 

Normal





- Psychiatric


Psychiatric exam: Present: normal affect, normal mood





- Skin


Skin exam: Present: warm, dry, intact, normal color.  Absent: rash, cyanosis, 

diaphoretic





ED Course


                                   Vital Signs











  10/22/19 10/22/19 10/22/19





  09:53 10:00 11:31


 


Temperature  97.8 F 


 


Pulse Rate 70  


 


Respiratory 18 18 





Rate   


 


Blood Pressure 143/52  


 


Blood Pressure   





[Left]   


 


O2 Sat by Pulse 92 92 97





Oximetry   














  10/22/19 10/22/19 10/22/19





  11:46 12:00 12:15


 


Temperature   


 


Pulse Rate 59 L 61 61


 


Respiratory 14 20 17





Rate   


 


Blood Pressure   142/56


 


Blood Pressure   





[Left]   


 


O2 Sat by Pulse 99 97 98





Oximetry   














  10/22/19 10/22/19 10/22/19





  12:30 12:46 13:00


 


Temperature   


 


Pulse Rate 60 62 64


 


Respiratory 12 16 21





Rate   


 


Blood Pressure 137/55 143/56 150/64


 


Blood Pressure   





[Left]   


 


O2 Sat by Pulse 99 98 96





Oximetry   














  10/22/19 10/22/19 10/22/19





  13:16 13:30 13:46


 


Temperature   


 


Pulse Rate 62 66 63


 


Respiratory 19 13 16





Rate   


 


Blood Pressure 144/56 153/65 148/58


 


Blood Pressure   





[Left]   


 


O2 Sat by Pulse 99 98 99





Oximetry   














  10/22/19 10/22/19 10/22/19





  14:00 14:16 14:48


 


Temperature   


 


Pulse Rate 64 65 70


 


Respiratory 15 16 11 L





Rate   


 


Blood Pressure 148/58 148/58 148/58


 


Blood Pressure   





[Left]   


 


O2 Sat by Pulse 99 98 99





Oximetry   














  10/22/19 10/22/19 10/22/19





  15:00 15:15 15:30


 


Temperature   


 


Pulse Rate 68 73 68


 


Respiratory 15 20 17





Rate   


 


Blood Pressure 169/68 172/68 157/58


 


Blood Pressure   





[Left]   


 


O2 Sat by Pulse 97 97 99





Oximetry   














  10/22/19 10/22/19 10/22/19





  15:45 16:00 16:16


 


Temperature   


 


Pulse Rate 66 67 65


 


Respiratory 18 16 17





Rate   


 


Blood Pressure 151/58 145/53 157/58


 


Blood Pressure   





[Left]   


 


O2 Sat by Pulse 99 100 98





Oximetry   














  10/22/19 10/22/19 10/22/19





  16:30 16:45 17:00


 


Temperature   


 


Pulse Rate 64 63 63


 


Respiratory 17 24 19





Rate   


 


Blood Pressure 145/62 140/63 149/55


 


Blood Pressure   





[Left]   


 


O2 Sat by Pulse 99 99 99





Oximetry   














  10/22/19 10/22/19 10/22/19





  17:10 17:20 17:41


 


Temperature   98.1 F


 


Pulse Rate 64 63 69


 


Respiratory 22 15 17





Rate   


 


Blood Pressure 149/55 143/53 


 


Blood Pressure   135/74





[Left]   


 


O2 Sat by Pulse 100 100 99





Oximetry   














  10/22/19 10/22/19 10/22/19





  17:46 17:55 17:56


 


Temperature 98.6 F  


 


Pulse Rate 69  


 


Respiratory 20  





Rate   


 


Blood Pressure 167/64 162/70 163/65


 


Blood Pressure   





[Left]   


 


O2 Sat by Pulse 98  





Oximetry   














ED Medical Decision Making





- Lab Data


Result diagrams: 


                                 10/22/19 10:06





                                 10/22/19 10:06








                                   Lab Results











  10/22/19 10/22/19 10/22/19 Range/Units





  10:06 10:06 10:06 


 


WBC  4.8    (4.5-11.0)  K/mm3


 


RBC  2.88 L    (3.65-5.03)  M/mm3


 


Hgb  9.4 L    (10.1-14.3)  gm/dl


 


Hct  29.1 L    (30.3-42.9)  %


 


MCV  101 H    (79-97)  fl


 


MCH  33 H    (28-32)  pg


 


MCHC  32    (30-34)  %


 


RDW  15.8 H    (13.2-15.2)  %


 


Plt Count  221    (140-440)  K/mm3


 


Lymph % (Auto)  12.0 L    (13.4-35.0)  %


 


Mono % (Auto)  8.4 H    (0.0-7.3)  %


 


Eos % (Auto)  2.2    (0.0-4.3)  %


 


Baso % (Auto)  1.3    (0.0-1.8)  %


 


Lymph #  0.6 L    (1.2-5.4)  K/mm3


 


Mono #  0.4    (0.0-0.8)  K/mm3


 


Eos #  0.1    (0.0-0.4)  K/mm3


 


Baso #  0.1    (0.0-0.1)  K/mm3


 


Seg Neutrophils %  76.1 H    (40.0-70.0)  %


 


Seg Neutrophils #  3.7    (1.8-7.7)  K/mm3


 


ESR     (0-20)  mm/Hr


 


PT    13.5  (12.2-14.9)  Sec.


 


INR    1.06  (0.87-1.13)  


 


APTT    37.3 H  (24.2-36.6)  Sec.


 


D-Dimer     (0-234)  ng/mlDDU


 


Sodium     (137-145)  mmol/L


 


Potassium     (3.6-5.0)  mmol/L


 


Chloride     ()  mmol/L


 


Carbon Dioxide     (22-30)  mmol/L


 


Anion Gap     mmol/L


 


BUN     (7-17)  mg/dL


 


Creatinine     (0.7-1.2)  mg/dL


 


Estimated GFR     ml/min


 


BUN/Creatinine Ratio     %


 


Glucose     ()  mg/dL


 


Calcium     (8.4-10.2)  mg/dL


 


Total Bilirubin     (0.1-1.2)  mg/dL


 


AST     (5-40)  units/L


 


ALT     (7-56)  units/L


 


Alkaline Phosphatase     ()  units/L


 


Troponin T   0.116 H*   (0.00-0.029)  ng/mL


 


C-Reactive Protein     (0.00-1.30)  mg/dL


 


NT-Pro-B Natriuret Pep     (0-900)  pg/mL


 


Total Protein     (6.3-8.2)  g/dL


 


Albumin     (3.9-5)  g/dL


 


Albumin/Globulin Ratio     %


 


Triglycerides   103   (2-149)  mg/dL


 


Cholesterol   123   ()  mg/dL


 


LDL Cholesterol Direct   40 L   ()  mg/dL


 


HDL Cholesterol   72 H   (40-59)  mg/dL


 


Cholesterol/HDL Ratio   1.70   %














  10/22/19 10/22/19 10/22/19 Range/Units





  10:06 10:06 10:06 


 


WBC     (4.5-11.0)  K/mm3


 


RBC     (3.65-5.03)  M/mm3


 


Hgb     (10.1-14.3)  gm/dl


 


Hct     (30.3-42.9)  %


 


MCV     (79-97)  fl


 


MCH     (28-32)  pg


 


MCHC     (30-34)  %


 


RDW     (13.2-15.2)  %


 


Plt Count     (140-440)  K/mm3


 


Lymph % (Auto)     (13.4-35.0)  %


 


Mono % (Auto)     (0.0-7.3)  %


 


Eos % (Auto)     (0.0-4.3)  %


 


Baso % (Auto)     (0.0-1.8)  %


 


Lymph #     (1.2-5.4)  K/mm3


 


Mono #     (0.0-0.8)  K/mm3


 


Eos #     (0.0-0.4)  K/mm3


 


Baso #     (0.0-0.1)  K/mm3


 


Seg Neutrophils %     (40.0-70.0)  %


 


Seg Neutrophils #     (1.8-7.7)  K/mm3


 


ESR   60   (0-20)  mm/Hr


 


PT     (12.2-14.9)  Sec.


 


INR     (0.87-1.13)  


 


APTT     (24.2-36.6)  Sec.


 


D-Dimer    472.97 H  (0-234)  ng/mlDDU


 


Sodium  134 L    (137-145)  mmol/L


 


Potassium  4.2    (3.6-5.0)  mmol/L


 


Chloride  94.4 L    ()  mmol/L


 


Carbon Dioxide  23    (22-30)  mmol/L


 


Anion Gap  21    mmol/L


 


BUN  33 H    (7-17)  mg/dL


 


Creatinine  6.1 H    (0.7-1.2)  mg/dL


 


Estimated GFR  7    ml/min


 


BUN/Creatinine Ratio  5    %


 


Glucose  347 H    ()  mg/dL


 


Calcium  8.7    (8.4-10.2)  mg/dL


 


Total Bilirubin  0.40    (0.1-1.2)  mg/dL


 


AST  89 H    (5-40)  units/L


 


ALT  82 H    (7-56)  units/L


 


Alkaline Phosphatase  125    ()  units/L


 


Troponin T     (0.00-0.029)  ng/mL


 


C-Reactive Protein     (0.00-1.30)  mg/dL


 


NT-Pro-B Natriuret Pep     (0-900)  pg/mL


 


Total Protein  7.5    (6.3-8.2)  g/dL


 


Albumin  3.9    (3.9-5)  g/dL


 


Albumin/Globulin Ratio  1.1    %


 


Triglycerides     (2-149)  mg/dL


 


Cholesterol     ()  mg/dL


 


LDL Cholesterol Direct     ()  mg/dL


 


HDL Cholesterol     (40-59)  mg/dL


 


Cholesterol/HDL Ratio     %














  10/22/19 10/22/19 10/22/19 Range/Units





  10:06 10:06 13:27 


 


WBC     (4.5-11.0)  K/mm3


 


RBC     (3.65-5.03)  M/mm3


 


Hgb     (10.1-14.3)  gm/dl


 


Hct     (30.3-42.9)  %


 


MCV     (79-97)  fl


 


MCH     (28-32)  pg


 


MCHC     (30-34)  %


 


RDW     (13.2-15.2)  %


 


Plt Count     (140-440)  K/mm3


 


Lymph % (Auto)     (13.4-35.0)  %


 


Mono % (Auto)     (0.0-7.3)  %


 


Eos % (Auto)     (0.0-4.3)  %


 


Baso % (Auto)     (0.0-1.8)  %


 


Lymph #     (1.2-5.4)  K/mm3


 


Mono #     (0.0-0.8)  K/mm3


 


Eos #     (0.0-0.4)  K/mm3


 


Baso #     (0.0-0.1)  K/mm3


 


Seg Neutrophils %     (40.0-70.0)  %


 


Seg Neutrophils #     (1.8-7.7)  K/mm3


 


ESR     (0-20)  mm/Hr


 


PT     (12.2-14.9)  Sec.


 


INR     (0.87-1.13)  


 


APTT     (24.2-36.6)  Sec.


 


D-Dimer     (0-234)  ng/mlDDU


 


Sodium     (137-145)  mmol/L


 


Potassium     (3.6-5.0)  mmol/L


 


Chloride     ()  mmol/L


 


Carbon Dioxide     (22-30)  mmol/L


 


Anion Gap     mmol/L


 


BUN     (7-17)  mg/dL


 


Creatinine     (0.7-1.2)  mg/dL


 


Estimated GFR     ml/min


 


BUN/Creatinine Ratio     %


 


Glucose     ()  mg/dL


 


Calcium     (8.4-10.2)  mg/dL


 


Total Bilirubin     (0.1-1.2)  mg/dL


 


AST     (5-40)  units/L


 


ALT     (7-56)  units/L


 


Alkaline Phosphatase     ()  units/L


 


Troponin T    0.115 H*  (0.00-0.029)  ng/mL


 


C-Reactive Protein  1.50 H    (0.00-1.30)  mg/dL


 


NT-Pro-B Natriuret Pep   95316 H   (0-900)  pg/mL


 


Total Protein     (6.3-8.2)  g/dL


 


Albumin     (3.9-5)  g/dL


 


Albumin/Globulin Ratio     %


 


Triglycerides     (2-149)  mg/dL


 


Cholesterol     ()  mg/dL


 


LDL Cholesterol Direct     ()  mg/dL


 


HDL Cholesterol     (40-59)  mg/dL


 


Cholesterol/HDL Ratio     %














- Radiology Data


Radiology results: report reviewed





Chest x-ray shows mild volume overload.








CT head is without acute findings.








VQ scan is low probability for PE.





- Medical Decision Making





Patient here for syncopal episodes today.  Hypoxia  noted at her cardiologist's 

office with a pulse ox of 91%.  CT head without acute findings.  VQ scan shows 

low PE probability.  Chest x-ray shows mild volume overload.  Troponin elevated.

  Discussed patient with Dr. Rajan to be admitted to hospital.


Critical care attestation.: 


If time is entered above; I have spent that time in minutes in the direct care 

of this critically ill patient, excluding procedure time.








ED Disposition


Clinical Impression: 


 Syncope and collapse, Elevated troponin





Disposition: DC- OP ADMIT IP TO THIS HOSP


Is pt being admited?: Yes


Condition: Stable

## 2019-10-23 VITALS — SYSTOLIC BLOOD PRESSURE: 162 MMHG | DIASTOLIC BLOOD PRESSURE: 62 MMHG

## 2019-10-23 LAB
BUN SERPL-MCNC: 47 MG/DL (ref 7–17)
BUN/CREAT SERPL: 6 %
CALCIUM SERPL-MCNC: 8.5 MG/DL (ref 8.4–10.2)
HEMOLYSIS INDEX: 6
INR PPP: 0.99 (ref 0.87–1.13)

## 2019-10-23 RX ADMIN — INSULIN LISPRO SCH: 100 INJECTION, SOLUTION INTRAVENOUS; SUBCUTANEOUS at 11:42

## 2019-10-23 RX ADMIN — INSULIN LISPRO SCH: 100 INJECTION, SOLUTION INTRAVENOUS; SUBCUTANEOUS at 16:14

## 2019-10-23 RX ADMIN — CALCIUM CARBONATE (ANTACID) CHEW TAB 500 MG SCH MG: 500 CHEW TAB at 20:27

## 2019-10-23 RX ADMIN — LIDOCAINE HYDROCHLORIDE ONE ML: 20 INJECTION, SOLUTION INFILTRATION; PERINEURAL at 09:19

## 2019-10-23 RX ADMIN — CALCIUM CARBONATE (ANTACID) CHEW TAB 500 MG SCH MG: 500 CHEW TAB at 14:14

## 2019-10-23 RX ADMIN — LIDOCAINE HYDROCHLORIDE ONE ML: 20 INJECTION, SOLUTION INFILTRATION; PERINEURAL at 09:22

## 2019-10-23 RX ADMIN — CARVEDILOL SCH: 25 TABLET, FILM COATED ORAL at 16:13

## 2019-10-23 RX ADMIN — ASPIRIN SCH MG: 81 TABLET, COATED ORAL at 10:57

## 2019-10-23 RX ADMIN — ASPIRIN SCH: 81 TABLET, COATED ORAL at 11:03

## 2019-10-23 RX ADMIN — CARVEDILOL SCH MG: 25 TABLET, FILM COATED ORAL at 11:15

## 2019-10-23 RX ADMIN — INSULIN LISPRO SCH: 100 INJECTION, SOLUTION INTRAVENOUS; SUBCUTANEOUS at 07:17

## 2019-10-23 RX ADMIN — CARVEDILOL SCH MG: 25 TABLET, FILM COATED ORAL at 18:26

## 2019-10-23 RX ADMIN — CARVEDILOL SCH: 25 TABLET, FILM COATED ORAL at 09:29

## 2019-10-23 RX ADMIN — CALCIUM CARBONATE (ANTACID) CHEW TAB 500 MG SCH: 500 CHEW TAB at 09:29

## 2019-10-23 NOTE — DISCHARGE SUMMARY
Providers





- Providers


Date of Admission: 


10/22/19 14:22





Date of discharge: 10/23/19


Attending physician: 


ALEX LAMAR





                                        





10/23/19 00:53


Consult to Physician [CONS] Routine 


   Comment: 


   Consulting Provider: CHENCHO GALLO


   Physician Instructions: 


   Reason For Exam: syncope





10/23/19 11:33


Consult to Physician [CONS] Routine 


   Comment: TIKA


   Consulting Provider: PHIL ALEXANDER


   Physician Instructions: CONSULT WAS CALLED TO /RENNY


   Reason For Exam: ESRD on dialysis











Primary care physician: 


VALENTINE MONTANA








Hospitalization


Condition: Fair


Disposition: DC-01 TO HOME OR SELFCARE





Exam





- Constitutional


Vitals: 


                                        











Temp Pulse Resp BP Pulse Ox


 


 97.9 F   64   18   140/50   99 


 


 10/23/19 13:39  10/23/19 13:39  10/23/19 13:39  10/23/19 13:39  10/23/19 13:39














Plan


Activity: advance as tolerated


Diet: low fat, low cholesterol, low salt, renal


Plan of Treatment: 


1.Follow up with PCP in 1 week


2.Follow up with Dr. Wheeler in 3-5 days.


3.Continue dialysis as scheduled


Follow up with: 


PRIMARY CARE,MD [Referring] - 3-5 Days

## 2019-10-23 NOTE — PROGRESS NOTE
Assessment and Plan








Syncope (multiple episodes in the past preceded by feeling of weakness)


   Negative VQ scan


   NAP on CT brain


   No arrhythmias on tele


Dilated Cardiomyopathy


   echocardiogram done 10/1/19 reveals severe pulmonary hypertension, PASP 81 

mmHG. In addition, there 


   is a decreased left ventricular systolic function, ejection fraction 35-40%.


   Cath showing mild to moderate diffuse non-obstructive coronary artery 

disease, LVEF 35-40%


LBBB


ESRD on HD (M/W/F)


Hypertension


Diabetes





Recommendations:





Guideline directed medical therapy with beta blocker and ACEi for non-ischemic 

cardiomyopathy


Outpatient cardiac follow-up


No further cardiac work-up is needed








Subjective


Date of service: 10/23/19


Principal diagnosis: Syncope


Interval history: 





Patient underwent a cardiac cath today without complications





Objective


                                   Vital Signs











  Temp Pulse Resp BP BP Pulse Ox


 


 10/23/19 07:05  98.0 F  67  18  157/68   93


 


 10/23/19 01:45  98.0 F  59 L  20  108/33   96


 


 10/23/19 00:24    20    93


 


 10/22/19 22:00   74    


 


 10/22/19 20:16  98.5 F  74  20  179/70   93


 


 10/22/19 17:56     163/65  


 


 10/22/19 17:55     162/70  


 


 10/22/19 17:46  98.6 F  69  20  167/64   98


 


 10/22/19 17:41  98.1 F  69  17   135/74  99


 


 10/22/19 17:20   63  15  143/53   100


 


 10/22/19 17:10   64  22  149/55   100


 


 10/22/19 17:00   63  19  149/55   99


 


 10/22/19 16:45   63  24  140/63   99


 


 10/22/19 16:30   64  17  145/62   99


 


 10/22/19 16:16   65  17  157/58   98


 


 10/22/19 16:00   67  16  145/53   100


 


 10/22/19 15:45   66  18  151/58   99


 


 10/22/19 15:30   68  17  157/58   99


 


 10/22/19 15:15   73  20  172/68   97


 


 10/22/19 15:00   68  15  169/68   97


 


 10/22/19 14:48   70  11 L  148/58   99


 


 10/22/19 14:16   65  16  148/58   98


 


 10/22/19 14:00   64  15  148/58   99


 


 10/22/19 13:46   63  16  148/58   99


 


 10/22/19 13:30   66  13  153/65   98


 


 10/22/19 13:16   62  19  144/56   99


 


 10/22/19 13:00   64  21  150/64   96


 


 10/22/19 12:46   62  16  143/56   98


 


 10/22/19 12:30   60  12  137/55   99


 


 10/22/19 12:15   61  17  142/56   98


 


 10/22/19 12:00   61  20    97


 


 10/22/19 11:46   59 L  14    99


 


 10/22/19 11:31       97


 


 10/22/19 10:00  97.8 F   18    92














- Physical Examination


HEENT: Positive: PERRL


Neck: Positive: neck supple


Cardiac: Positive: Reg Rate and Rhythm


Lungs: Positive: Normal Exam





- Labs and Meds


                                 Cardiac Enzymes











  10/22/19 Range/Units





  10:06 


 


AST  89 H  (5-40)  units/L








                                   Coagulation











  10/22/19 10/23/19 Range/Units





  10:06 05:20 


 


PT  13.5  12.8  (12.2-14.9)  Sec.


 


INR  1.06  0.99  (0.87-1.13)  


 


APTT  37.3 H   (24.2-36.6)  Sec.








                                     Lipids











  10/22/19 Range/Units





  10:06 


 


Triglycerides  103  (2-149)  mg/dL


 


Cholesterol  123  ()  mg/dL


 


HDL Cholesterol  72 H  (40-59)  mg/dL


 


Cholesterol/HDL Ratio  1.70  %








                                       CBC











  10/22/19 Range/Units





  10:06 


 


WBC  4.8  (4.5-11.0)  K/mm3


 


RBC  2.88 L  (3.65-5.03)  M/mm3


 


Hgb  9.4 L  (10.1-14.3)  gm/dl


 


Hct  29.1 L  (30.3-42.9)  %


 


Plt Count  221  (140-440)  K/mm3


 


Lymph #  0.6 L  (1.2-5.4)  K/mm3


 


Mono #  0.4  (0.0-0.8)  K/mm3


 


Eos #  0.1  (0.0-0.4)  K/mm3


 


Baso #  0.1  (0.0-0.1)  K/mm3








                          Comprehensive Metabolic Panel











  10/22/19 10/23/19 Range/Units





  10:06 05:20 


 


Sodium  134 L  137  (137-145)  mmol/L


 


Potassium  4.2  4.1  (3.6-5.0)  mmol/L


 


Chloride  94.4 L  95.6 L  ()  mmol/L


 


Carbon Dioxide  23  22  (22-30)  mmol/L


 


BUN  33 H  47 H  (7-17)  mg/dL


 


Creatinine  6.1 H  8.2 H  (0.7-1.2)  mg/dL


 


Glucose  347 H  108 H  ()  mg/dL


 


Calcium  8.7  8.5  (8.4-10.2)  mg/dL


 


AST  89 H   (5-40)  units/L


 


ALT  82 H   (7-56)  units/L


 


Alkaline Phosphatase  125   ()  units/L


 


Total Protein  7.5   (6.3-8.2)  g/dL


 


Albumin  3.9   (3.9-5)  g/dL














- Imaging and Cardiology


EKG: report reviewed

## 2019-10-23 NOTE — CONSULTATION
History of Present Illness





- History of Present Illness





74 year old lady with medical history significant for HTN, CAD, ESRD on hemod

ialysis via a Left arm AVF on Monday, Wednesday and Friday, presenting with 

complaints of  syncopal event at her doctors office.  She did have a premonition

of the event and reports she was told she rapidly gained consciousness. She had 

a cardiac catheterization procedure today .  She has been compliant with her  

dialysis treatment  She denies any  exertional dypsnea , orthopnea , PND. 

Patient denies any associated fevers or chills.Denies any lower extremity edema.

 








Medications and Allergies


                                    Allergies











Allergy/AdvReac Type Severity Reaction Status Date / Time


 


No Known Allergies Allergy   Verified 09/30/19 22:48











                                Home Medications











 Medication  Instructions  Recorded  Confirmed  Last Taken  Type


 


Carvedilol [Coreg] 25 mg PO BID 04/08/19 10/22/19 10/22/19 08:00 History


 


Furosemide [Lasix TAB] 40 mg PO QDAY 04/08/19 10/22/19 09/30/19 History


 


Insulin Glargine,Hum.rec.anlog 14 units SQ DAILY 04/08/19 10/22/19 10/22/19 

08:00 History





[Lantus]    14 units 


 


Levothyroxine [Synthroid] 75 mcg PO QAM 04/08/19 10/22/19 10/22/19 08:00 History


 


Rosuvastatin Calcium 5 mg PO DAILY 04/08/19 10/22/19 10/21/19 22:00 History


 


glipiZIDE [Glipizide] 5 mg PO DAILY 04/08/19 10/22/19 10/22/19 08:00 History


 


Calcitriol [Rocaltrol] 0.5 mcg PO QDAY #30 capsule 04/11/19 10/22/19 Unknown Rx


 


Calcium Carbonate [Tums 500MG CHEW] 1,000 mg PO TID 30 Days  tablet 04/11/19 

10/22/19 10/22/19 08:00 Rx


 


NIFEdipine XL [Procardia Xl] 60 mg PO Q12HR #60 tablet 04/11/19 10/22/19 

10/22/19 Rx


 


Vitamin D3 1,000 UNIT TAB 2,000 units PO DAILY 10/01/19 10/22/19 Unknown History


 


Lisinopril [Zestril TAB] 40 mg PO QDAY #30 tablet 10/02/19 10/22/19 Unknown Rx











Active Meds: 


Active Medications





Acetaminophen (Tylenol)  650 mg PO Q4H PRN


   PRN Reason: Pain MILD(1-3)/Fever >100.5/HA


Aspirin (Halfprin Ec)  81 mg PO QDAY [facility]


   Last Admin: 10/23/19 11:03 Dose:  Not Given


   Documented by: 


Atorvastatin Calcium (Lipitor)  10 mg PO QHS [facility]


Calcitriol (Rocaltrol)  0.5 mcg PO QDAY [facility]


   Last Admin: 10/23/19 10:58 Dose:  0.5 mcg


   Documented by: 


Calcium Carbonate/Glycine (Tums)  1,000 mg PO TID [facility]


   Last Admin: 10/23/19 14:14 Dose:  1,000 mg


   Documented by: 


Carvedilol (Coreg)  25 mg PO BID@0800,1700 [facility]


   Last Admin: 10/23/19 16:13 Dose:  Not Given


   Documented by: 


Cholecalciferol (Vitamin D3)  2,000 unit PO DAILY [facility]


   Last Admin: 10/23/19 10:57 Dose:  2,000 unit


   Documented by: 


Famotidine (Pepcid)  10 mg PO BID [facility]


   Last Admin: 10/23/19 10:57 Dose:  10 mg


   Documented by: 


Furosemide (Lasix)  40 mg PO QDAY [facility]


   Last Admin: 10/23/19 10:57 Dose:  40 mg


   Documented by: 


Glipizide (Glucotrol)  5 mg PO QDDIAB [facility]


   Last Admin: 10/23/19 09:29 Dose:  Not Given


   Documented by: 


Heparin Sodium (Porcine) (Heparin)  5,000 unit SUB-Q Q12HR [facility]


   Last Admin: 10/23/19 11:01 Dose:  5,000 unit


   Documented by: 


Hydromorphone HCl (Dilaudid)  0.5 mg IV Q3H PRN


   PRN Reason: Pain , Severe (7-10)


Sodium Chloride (Nacl 0.9%)  100 mls @ 999 mls/hr IV MICHELLE PRN


   PRN Reason: Hypotension


Insulin Glargine (Lantus)  14 units SUB-Q DAILY [facility]


   Last Admin: 10/23/19 11:10 Dose:  14 units


   Documented by: 


Insulin Human Lispro (Humalog)  0 unit SUB-Q ACHS [facility]; Protocol


   Last Admin: 10/23/19 16:14 Dose:  Not Given


   Documented by: 


Levothyroxine Sodium (Synthroid)  75 mcg PO QAM@0600 [facility]


   Last Admin: 10/23/19 05:39 Dose:  75 mcg


   Documented by: 


Lisinopril (Zestril)  40 mg PO QDAY [facility]


   Last Admin: 10/23/19 10:57 Dose:  40 mg


   Documented by: 


Metoclopramide HCl (Reglan)  5 mg IV Q6H PRN


   PRN Reason: Nausea And Vomiting


Nifedipine (Procardia Xl)  60 mg PO BID [facility]


   Last Admin: 10/23/19 14:14 Dose:  60 mg


   Documented by: 


Ondansetron HCl (Zofran)  4 mg IV Q8H PRN


   PRN Reason: Nausea And Vomiting


Oxycodone/Acetaminophen (Percocet 5/325)  1 tab PO Q6H PRN


   PRN Reason: Pain, Moderate (4-6)


Sodium Chloride (Sodium Chloride Flush Syringe 10 Ml)  10 ml IV BID [facility]


   Last Admin: 10/23/19 10:59 Dose:  10 ml


   Documented by: 


Sodium Chloride (Sodium Chloride Flush Syringe 10 Ml)  10 ml IV PRN PRN


   PRN Reason: LINE FLUSH











Review of Systems


Constitutional: no weight loss, no weight gain, no fever, no chills


Breasts: no deferred, no change in shape


Cardiovascular: no chest pain, no orthopnea, no palpitations


Respiratory: no cough, no cough with sputum


Gastrointestinal: no abdominal pain, no nausea, no vomiting


Genitourinary Female: no dyspareunia, no dysmenorrhea


Menstruation: currently menstrual, no premenarcheal


Rectal: no pain, no incontinence


Integumentary: no deferred, no rash


Neurological: no head injury, no transient paralysis


Psychiatric: no anxiety, no memory loss


Endocrine: no cold intolerance, no heat intolerance


Hematologic/Lymphatic: no easy bruising, no easy bleeding


Allergic/Immunologic: no urticaria, no allergic rhinitis





Exam





- Vital Signs


Vital signs: 


                                   Vital Signs











Pulse Resp BP Pulse Ox


 


 70   18   143/52   92 


 


 10/22/19 09:53  10/22/19 09:53  10/22/19 09:53  10/22/19 09:53














- General Appearance


General appearance: well-developed, well-nourished


EENT: ATNC, PERRL, mucous membranes moist


Neck: Present: neck supple


Respiratory: Clear to Ascultation


Heart: regular, S1S2


Gastrointestinal: Present: normal, normoactive bowel sounds


Integumentary: no rash


Neurologic: no focal deficit, CN 3-12 intact


Psychiatric: mood/affect appropriate





Results





- Lab Results





                                 10/22/19 10:06





                                 10/23/19 05:20


                             Most recent lab results











Calcium  8.5 mg/dL (8.4-10.2)   10/23/19  05:20    














- Image


Kidney/bladder ultrasound: other (I reviewed CXR with some interstitial edema. )





Assessment and Plan





- Patient Problems


(1) ESRD needing dialysis


Current Visit: Yes   Status: Chronic   


Plan to address problem: 


ESRD on hemodialysis 


-access: left arm AVF 


- will initiate HD 


- continue dialysis MWF. 








(2) Syncope and collapse


Current Visit: Yes   Status: Acute   


Plan to address problem: 


Syncope and collapse 


- given nature of event concern for cardiac etiology 


- s/p cardiac cath today 


- cardiology follow up . 








(3) Type 2 diabetes mellitus


Current Visit: Yes   Status: Chronic   


Qualifiers: 


   Diabetes mellitus long term insulin use: unspecified long term insulin use 

status 


Plan to address problem: 


DM type II : uncontrolled. 


will continue current medications 


Monitor fingerstick. 








(4) Anemia


Current Visit: No   Status: Acute   


Plan to address problem: 


moderate anemia : 


Hb: 9.4g/dl 


2/2 End stage renal disease 


Monitor CBC.

## 2019-10-23 NOTE — HISTORY AND PHYSICAL REPORT
History of Present Illness


Date of examination: 10/22/19


Date of admission: 


10/22/19 14:22





Chief complaint: 


Syncopal episode at her cardiologist's office this morning





History of present illness: 


74-year-old female with history of end-stage renal disease on dialysis, 

hypertension and diabetes apparently passed out at the cardiologist's office 

waiting for appointment.  Patient follows with [facility].  

Patient passed out for a few seconds.   caught her while she was falling 

preventing head injury.  No chest pain.  Some shortness of breath present.  No 

diaphoresis.  No no fever or chills.








Past Medical History


Hypertension: Yes


Diabetes: Yes


Renal Disease: Yes





Surgical History


Additional Surgical History:   x2.  Vas cath.  graft to left arm.  left

eye.  goiter removed





Social History 


Smoking Status: Never Smoker


Substance Use Type: Alcohol 





Family history


 Htn





- Medications


Home Medications: 


                                Home Medications











 Medication  Instructions  Recorded  Confirmed  Last Taken  Type


 


Carvedilol [Coreg] 25 mg PO BID 04/08/19 10/01/19 09/30/19 History


 


Furosemide [Lasix TAB] 40 mg PO QDAY 04/08/19 10/01/19 09/30/19 History


 


Insulin Glargine,Hum.rec.anlog 14 units SQ DAILY 04/08/19 10/01/19 09/30/19 

History





[Lantus]     


 


Levothyroxine [Synthroid] 75 mcg PO QAM 04/08/19 10/01/19 09/30/19 History


 


Rosuvastatin Calcium 5 mg PO DAILY 04/08/19 10/01/19 09/30/19 History


 


glipiZIDE [Glipizide] 5 mg PO DAILY 04/08/19 10/01/19 09/30/19 History


 


Calcitriol [Rocaltrol] 0.5 mcg PO QDAY #30 capsule 04/11/19 10/01/19 Unknown Rx


 


Calcium Carbonate [Tums 500MG CHEW] 1,000 mg PO TID 30 Days  tablet 04/11/19 

10/01/19 Unknown Rx


 


NIFEdipine XL [Procardia Xl] 60 mg PO Q12HR #60 tablet 04/11/19 10/01/19 

09/30/19 Rx


 


Vitamin D3 1,000 UNIT TAB 2,000 units PO DAILY 10/01/19 10/01/19 Unknown History


 


Lisinopril [Zestril TAB] 40 mg PO QDAY #30 tablet 10/02/19  Unknown Rx











Review of Systems


ROS: 


Stated complaint: SYNCOPE


Other details as noted in HPI





Constitutional: denies: chills, fever


Eyes: denies: eye pain, vision change


ENT: denies: ear pain, throat pain


Respiratory: denies: cough, shortness of breath, SOB with exertion, SOB at rest,

wheezing


Cardiovascular: denies: chest pain, palpitations


Endocrine: no symptoms reported


Gastrointestinal: denies: abdominal pain, nausea, diarrhea


Genitourinary: denies: urgency, dysuria, discharge


Musculoskeletal: denies: back pain, joint swelling, arthralgia


Skin: denies: rash, lesions


Neurological: weakness.  denies: numbness, paresthesias, abnormal gait, vertigo


Psychiatric: denies: anxiety, depression


Hematological/Lymphatic: denies: easy bleeding, easy bruising














Medications and Allergies


                                    Allergies











Allergy/AdvReac Type Severity Reaction Status Date / Time


 


No Known Allergies Allergy   Verified 19 22:48











                                Home Medications











 Medication  Instructions  Recorded  Confirmed  Last Taken  Type


 


Carvedilol [Coreg] 25 mg PO BID 04/08/19 10/22/19 10/22/19 08:00 History


 


Furosemide [Lasix TAB] 40 mg PO QDAY 04/08/19 10/22/19 09/30/19 History


 


Insulin Glargine,Hum.rec.anlog 14 units SQ DAILY 04/08/19 10/22/19 10/22/19 

08:00 History





[Lantus]    14 units 


 


Levothyroxine [Synthroid] 75 mcg PO QAM 04/08/19 10/22/19 10/22/19 08:00 History


 


Rosuvastatin Calcium 5 mg PO DAILY 04/08/19 10/22/19 10/21/19 22:00 History


 


glipiZIDE [Glipizide] 5 mg PO DAILY 04/08/19 10/22/19 10/22/19 08:00 History


 


Calcitriol [Rocaltrol] 0.5 mcg PO QDAY #30 capsule 04/11/19 10/22/19 Unknown Rx


 


Calcium Carbonate [Tums 500MG CHEW] 1,000 mg PO TID 30 Days  tablet 04/11/19 

10/22/19 10/22/19 08:00 Rx


 


NIFEdipine XL [Procardia Xl] 60 mg PO Q12HR #60 tablet 04/11/19 10/22/19 

10/22/19 Rx


 


Vitamin D3 1,000 UNIT TAB 2,000 units PO DAILY 10/01/19 10/22/19 Unknown History


 


Lisinopril [Zestril TAB] 40 mg PO QDAY #30 tablet 10/02/19 10/22/19 Unknown Rx














Exam





- Constitutional


Vitals: 


                                        











Temp Pulse Resp BP Pulse Ox


 


 98.5 F   74   20   179/70   93 


 


 10/22/19 20:16  10/22/19 22:00  10/22/19 20:16  10/22/19 20:16  10/22/19 20:16











General appearance: Present: no acute distress, well-nourished





- EENT


Eyes: Present: PERRL


ENT: hearing intact, clear oral mucosa





- Neck


Neck: Present: supple, normal ROM





- Respiratory


Respiratory effort: normal


Respiratory: bilateral: CTA





- Cardiovascular


Heart rate: 78


Heart Sounds: Present: S1 & S2.  Absent: rub, click





- Extremities


Extremities: pulses symmetrical, No edema


Peripheral Pulses: within normal limits





- Abdominal


General gastrointestinal: Present: soft, non-tender, non-distended, normal bowel

sounds


Female genitourinary: Present: normal





- Rectal


Rectal Exam: deferred





- Integumentary


Integumentary: Present: clear, warm, dry





- Musculoskeletal


Musculoskeletal: gait normal, strength equal bilaterally





- Psychiatric


Psychiatric: appropriate mood/affect, intact judgment & insight





- Neurologic


Neurologic: CNII-XII intact, moves all extremities





- Allied Health


Allied health notes reviewed: nursing, case management





Results





- Labs


CBC & Chem 7: 


                                 10/22/19 10:06





                                 10/22/19 10:06


Labs: 


                             Laboratory Last Values











WBC  4.8 K/mm3 (4.5-11.0)   10/22/19  10:06    


 


RBC  2.88 M/mm3 (3.65-5.03)  L  10/22/19  10:06    


 


Hgb  9.4 gm/dl (10.1-14.3)  L  10/22/19  10:06    


 


Hct  29.1 % (30.3-42.9)  L  10/22/19  10:06    


 


MCV  101 fl (79-97)  H  10/22/19  10:06    


 


MCH  33 pg (28-32)  H  10/22/19  10:06    


 


MCHC  32 % (30-34)   10/22/19  10:06    


 


RDW  15.8 % (13.2-15.2)  H  10/22/19  10:06    


 


Plt Count  221 K/mm3 (140-440)   10/22/19  10:06    


 


Lymph % (Auto)  12.0 % (13.4-35.0)  L  10/22/19  10:06    


 


Mono % (Auto)  8.4 % (0.0-7.3)  H  10/22/19  10:06    


 


Eos % (Auto)  2.2 % (0.0-4.3)   10/22/19  10:06    


 


Baso % (Auto)  1.3 % (0.0-1.8)   10/22/19  10:06    


 


Lymph #  0.6 K/mm3 (1.2-5.4)  L  10/22/19  10:06    


 


Mono #  0.4 K/mm3 (0.0-0.8)   10/22/19  10:06    


 


Eos #  0.1 K/mm3 (0.0-0.4)   10/22/19  10:06    


 


Baso #  0.1 K/mm3 (0.0-0.1)   10/22/19  10:06    


 


Seg Neutrophils %  76.1 % (40.0-70.0)  H  10/22/19  10:06    


 


Seg Neutrophils #  3.7 K/mm3 (1.8-7.7)   10/22/19  10:06    


 


ESR  60 mm/Hr (0-20)   10/22/19  10:06    


 


PT  13.5 Sec. (12.2-14.9)   10/22/19  10:06    


 


INR  1.06  (0.87-1.13)   10/22/19  10:06    


 


APTT  37.3 Sec. (24.2-36.6)  H  10/22/19  10:06    


 


D-Dimer  472.97 ng/mlDDU (0-234)  H  10/22/19  10:06    


 


Sodium  134 mmol/L (137-145)  L  10/22/19  10:06    


 


Potassium  4.2 mmol/L (3.6-5.0)   10/22/19  10:06    


 


Chloride  94.4 mmol/L ()  L  10/22/19  10:06    


 


Carbon Dioxide  23 mmol/L (22-30)   10/22/19  10:06    


 


Anion Gap  21 mmol/L  10/22/19  10:06    


 


BUN  33 mg/dL (7-17)  H  10/22/19  10:06    


 


Creatinine  6.1 mg/dL (0.7-1.2)  H  10/22/19  10:06    


 


Estimated GFR  7 ml/min  10/22/19  10:06    


 


BUN/Creatinine Ratio  5 %  10/22/19  10:06    


 


Glucose  347 mg/dL ()  H  10/22/19  10:06    


 


POC Glucose  219  ()  H  10/22/19  22:13    


 


Calcium  8.7 mg/dL (8.4-10.2)   10/22/19  10:06    


 


Total Bilirubin  0.40 mg/dL (0.1-1.2)   10/22/19  10:06    


 


AST  89 units/L (5-40)  H  10/22/19  10:06    


 


ALT  82 units/L (7-56)  H  10/22/19  10:06    


 


Alkaline Phosphatase  125 units/L ()   10/22/19  10:06    


 


Troponin T  0.113 ng/mL (0.00-0.029)  H*  10/22/19  15:25    


 


C-Reactive Protein  1.50 mg/dL (0.00-1.30)  H  10/22/19  10:06    


 


NT-Pro-B Natriuret Pep  36650 pg/mL (0-900)  H  10/22/19  10:06    


 


Total Protein  7.5 g/dL (6.3-8.2)   10/22/19  10:06    


 


Albumin  3.9 g/dL (3.9-5)   10/22/19  10:06    


 


Albumin/Globulin Ratio  1.1 %  10/22/19  10:06    


 


Triglycerides  103 mg/dL (2-149)   10/22/19  10:06    


 


Cholesterol  123 mg/dL ()   10/22/19  10:06    


 


LDL Cholesterol Direct  40 mg/dL ()  L  10/22/19  10:06    


 


HDL Cholesterol  72 mg/dL (40-59)  H  10/22/19  10:06    


 


Cholesterol/HDL Ratio  1.70 %  10/22/19  10:06    








                                    Short CBC











  10/22/19 Range/Units





  10:06 


 


WBC  4.8  (4.5-11.0)  K/mm3


 


Hgb  9.4 L  (10.1-14.3)  gm/dl


 


Hct  29.1 L  (30.3-42.9)  %


 


Plt Count  221  (140-440)  K/mm3








                                       BMP











  10/22/19





  10:06


 


Sodium  134 L


 


Potassium  4.2


 


Chloride  94.4 L


 


Carbon Dioxide  23


 


BUN  33 H


 


Creatinine  6.1 H


 


Glucose  347 H


 


Calcium  8.7








                                 Cardiac Enzymes











  10/22/19 10/22/19 10/22/19 Range/Units





  10:06 13:27 15:25 


 


Troponin T  0.116 H*  0.115 H*  0.113 H*  (0.00-0.029)  ng/mL








                                 Liver Function











  10/22/19 Range/Units





  10:06 


 


Total Bilirubin  0.40  (0.1-1.2)  mg/dL


 


AST  89 H  (5-40)  units/L


 


ALT  82 H  (7-56)  units/L


 


Alkaline Phosphatase  125  ()  units/L


 


Albumin  3.9  (3.9-5)  g/dL














- Imaging and Cardiology


EKG: report reviewed


Chest x-ray: report reviewed (mild volume overload)


CT Scan - head: report reviewed (no acute findings)


Imaging and Cardiology: 


Nuclear perfusion scan





IMPRESSION:


Low probability for pulmonary embolism.











Assessment and Plan


Advance Directives: Yes (full code)


VTE prophylaxis?: Chemical


Plan of care discussed with patient/family: Yes





- Patient Problems


(1) Syncope and collapse


Current Visit: Yes   Status: Acute   


Plan to address problem: 


Probably volume depletion versus vasovagal


Patient has dilated cardiomyopathy


Has ejection fraction of 35-40%


Patient did get client and left heart catheterization tomorrow and assessment of

the severity of pulmonary hypertension








(2) ESRD needing dialysis


Current Visit: No   Status: Chronic   


Plan to address problem: 


Continue hemodialysis








(3) HTN (hypertension)


Current Visit: No   Status: Chronic   


Qualifiers: 


   Hypertension type: essential hypertension   Qualified Code(s): I10 - 

Essential (primary) hypertension   


Plan to address problem: 


Continue antihypertensives








(4) Type 2 diabetes mellitus


Current Visit: Yes   Status: Chronic   


Qualifiers: 


   Diabetes mellitus long term insulin use: unspecified long term insulin use 

status 


Plan to address problem: 


Coverage for now


\Check hemoglobin A1c








(5) DVT prophylaxis


Current Visit: No   Status: Acute   


Plan to address problem: 


Heparin 5000 every 12 and GI prophylaxis

## 2019-10-23 NOTE — CARDIAC CATHERIZATION REPORT
LEFT AND RIGHT HEART CATHETERIZATION



INDICATIONS:  Syncope, dilated cardiomyopathy, pulmonary hypertension.



ORDERING PHYSICIAN:  Terrence Goode M.D.



PROCEDURES PERFORMED:

1.  Selective left and right coronary angiography.

2.  Left ventriculography.

3.  Right heart catheterization with hemodynamic measurement and oxygen

saturation run.



DESCRIPTION OF PROCEDURE:  After obtaining the consent, the patient was draped

using sterile technique.  2% lidocaine was injected into the right groin.  Using

a micropuncture needle, a 5-Taiwanese vascular sheath was inserted into the right

femoral artery and a 7-[place]ese vascular sheath was inserted into the right

femoral vein.  A 7-[place] catheter was used to measure right-sided

hemodynamics and perform oxygen saturation run.  A 5-[place]ese JL4 catheter was

used to selectively engage left coronary artery.  A 5-[place]ai[place]ese JR4 catheter was

used to selectively engage the right coronary artery.  A 5-[place]ai[place]ese JR4 catheter

was used to hand inject the left ventriculogram.  No complications occurred

during the procedure.  Hemostasis was achieved at the end of the procedure using

a 5-Taiwanese MynxGrip closure device.



ESTIMATED BLOOD LOSS:  Minimal.



SPECIMEN REMOVED:  None.



SEDATION ADMINISTERED:  1 mg of IV Versed and 50 mcg of IV fentanyl.



PHYSICIAN-PATIENT FACE-TO-FACE SEDATION START TIME:  9:18 a.m.



PHYSICIAN-PATIENT FACE-TO-FACE SEDATION STOP TIME:  9:43 a.m.



TOTAL SEDATION TIME:  35 minutes.



FINDINGS:

HEMODYNAMICS:

1.  Mean pulmonary capillary wedge pressure was 25 mmHg.

2.  Pulmonary artery systolic pressure 72 mmHg, pulmonary artery diastolic

pressure 22 mmHg and mean pulmonary artery pressure was 46 mmHg.

3.  Right ventricular systolic pressure was 71 mmHg with right ventricular

end-diastolic pressure of 25 mmHg.

4.  Mean right arterial pressure 16 mmHg.

5.  Aortic pressure is 164/66.  LV systolic pressure is 167 mmHg.  LV

end-diastolic pressure 27 mmHg.

6.  Aortic saturation 80% and PA saturation 52%.

7.  Angel cardiac output 5.83 liters per minute and a Angel cardiac index of 3.71

liters per minute per meter square.



CARDIAC STRUCTURES:  The left ventricular cavity is mildly dilated.  There is

moderate global left ventricular hypokinesis.  The left ventricular ejection

fraction is estimated between 35% and 40%.



CORONARY ANATOMY:

1.  This is a right dominant circulation.

2.  The left main has mild-to-moderate diffuse disease.

3.  The LAD has mild-to-moderate diffuse disease.  There is a 70% ostial

stenosis of the second diagonal artery.

4.  The left circumflex artery has mild-to-moderate diffuse disease.

5.  The right coronary artery has mild-to-moderate diffuse disease.



IMPRESSION:

1.  Mild-to-moderate diffuse nonobstructive coronary artery disease.

2.  70% ostial second diagonal artery stenosis, will be recommended for medical

therapy.

3.  Moderate global left ventricular hypokinesis with an ejection fraction

estimated between 35% and 40%.

4.  Moderate-to-severe pulmonary hypertension with evidence of elevated left and

right sided filling pressures.

5.  Preserved cardiac output and cardiac index.



RECOMMENDATION:  Continue guideline directed medical therapy, fluid management

through hemodialysis and follow up in the office.





DD: 10/23/2019 10:05

DT: 10/23/2019 10:27

[facility]# [id number]  [id number]

VARUN/NATHEN

## 2020-09-18 ENCOUNTER — HOSPITAL ENCOUNTER (OUTPATIENT)
Dept: HOSPITAL 5 - GIO | Age: 75
Discharge: HOME | End: 2020-09-18
Attending: INTERNAL MEDICINE
Payer: MEDICARE

## 2020-09-18 DIAGNOSIS — K22.2: ICD-10-CM

## 2020-09-18 DIAGNOSIS — Z98.891: ICD-10-CM

## 2020-09-18 DIAGNOSIS — Z12.11: Primary | ICD-10-CM

## 2020-09-18 DIAGNOSIS — K57.30: ICD-10-CM

## 2020-09-18 DIAGNOSIS — E11.22: ICD-10-CM

## 2020-09-18 DIAGNOSIS — Z98.890: ICD-10-CM

## 2020-09-18 DIAGNOSIS — I42.8: ICD-10-CM

## 2020-09-18 DIAGNOSIS — Z79.899: ICD-10-CM

## 2020-09-18 DIAGNOSIS — Z79.4: ICD-10-CM

## 2020-09-18 DIAGNOSIS — K64.8: ICD-10-CM

## 2020-09-18 DIAGNOSIS — K63.5: ICD-10-CM

## 2020-09-18 DIAGNOSIS — I12.0: ICD-10-CM

## 2020-09-18 DIAGNOSIS — K21.0: ICD-10-CM

## 2020-09-18 DIAGNOSIS — K29.70: ICD-10-CM

## 2020-09-18 DIAGNOSIS — D64.9: ICD-10-CM

## 2020-09-18 DIAGNOSIS — N18.6: ICD-10-CM

## 2020-09-18 PROCEDURE — 43249 ESOPH EGD DILATION <30 MM: CPT

## 2020-09-18 PROCEDURE — 88305 TISSUE EXAM BY PATHOLOGIST: CPT

## 2020-09-18 PROCEDURE — 43239 EGD BIOPSY SINGLE/MULTIPLE: CPT

## 2020-09-18 PROCEDURE — 45385 COLONOSCOPY W/LESION REMOVAL: CPT

## 2020-09-18 PROCEDURE — 88342 IMHCHEM/IMCYTCHM 1ST ANTB: CPT

## 2020-09-18 PROCEDURE — 82962 GLUCOSE BLOOD TEST: CPT

## 2020-09-18 PROCEDURE — C1726 CATH, BAL DIL, NON-VASCULAR: HCPCS

## 2020-09-18 NOTE — OPERATIVE REPORT
PROCEDURE:  Esophagogastroduodenoscopy with biopsy and esophageal balloon

dilation.



INDICATIONS:  This is a 75-year-old Bangl[place] female with an underlying history

of hypertension, diabetes, end-stage renal disease.  The patient is a possible

candidate for kidney transplant.  She has been having lately some problems with

dysphagia.  EGD was done to assess for the problem.



DESCRIPTION OF PROCEDURE:  The procedure was done after getting informed consent

with MAC anesthesia.  Instrument was passed through the hypopharynx into the

esophagus, which showed some mild to moderate Candida esophagitis.  Photo

documentation and biopsy was obtained.  There was some mild benign esophageal

stenosis noted.  This was dilated with 20 mm balloon.  At the end of the

procedure that was maintained for a minute.  There was also mild to moderate

erosive esophagitis and biopsy was also done from the distal esophagus to assess

for the severity of erosive esophagitis.  Stomach showed antral gastritis.  No

gastric ulcers were noted in the straight or the retroverted view.  Biopsy was

done from the gastric antrum, gastric body and angular incisura to rule out for

H. pylori and atrophic gastritis.  The pylorus was patent.  The duodenum in the

first and second portion appeared normal.



ASSESSMENT:  Dysphagia, mild, benign esophageal stenosis, status post balloon

dilation, Candida esophagitis, mild to moderate erosive esophagitis, gastritis.



PLAN:  To treat the patient with PPI and fluconazole.  Avoid aspirin and

aspirin-related products.  A colonoscopy will be done as part of colon polyp

screening and the patient will be asked to follow up in the office in 1-2 weeks'

time.



The procedure was done in the GI lab with assistance of the GI lab team, which

included RN, Donna Fischer, dylon Brunner and with assistance of anesthesia.





DD: 09/18/2020 10:39

DT: 09/18/2020 10:43

JOB# [id number]  [id number]

EVERARDO/NATHEN

## 2020-09-18 NOTE — PROCEDURE NOTE
Date of procedure: 09/18/20


Pre-op diagnosis: Dysphagia/ Colon Polyp Screening


Post-op diagnosis: other (Mild to Moderate Candida Esophagitis/ Mild to Moderate

Erosive Esophagitis/ Mild Benign Esophageal Stenosis/ Gastritis/ Cecal 

Polyp/Moderate,Right Colon Diverticuli/Minor,Internal Hemorrhoid)


Procedure: 





EGD with Biopsy and s/p Esophageal Balloon Dilation/Colonoscopy with Hot Snare 

Polypectomy


Anesthesia: MAC


Surgeon: ANTONIA PERERA


Estimated blood loss: minimal


Pathology: list


Specimen disposition: to lab


Condition: stable


Disposition: same day (Avoid aspirin and NSAID and anticoagulants for 5 days; 

otherwise resume home medication.  Treat with PPI and Fluconazole and follow up 

in 1 to 2 weeks ([phone]).)

## 2020-09-18 NOTE — ANESTHESIA CONSULTATION
Anesthesia Consult and Med Hx


Date of service: 09/18/20





- Airway


Anesthetic Teeth Evaluation: Good


ROM Head & Neck: Adequate


Mental/Hyoid Distance: Adequate


Mallampati Class: Class II





- Pulmonary Exam


CTA: Yes





- Cardiac Exam


Cardiac Exam: RRR





- Pre-Operative Health Status


ASA Pre-Surgery Classification: ASA2


Proposed Anesthetic Plan: MAC





- Cardiovascular System


Hx Hypertension: Yes





- Central Nervous System


Hx Psychiatric Problems: No





- Endocrine


Hx Renal Disease: Yes


Hx End Stage Renal Disease: Yes





- Other Systems


Hx Cancer: No

## 2020-09-18 NOTE — OPERATIVE REPORT
PROCEDURE:  Colonoscopy with hot snare polypectomy.



INDICATIONS:  This is a 75-year-old [place] female with underlying history of

diabetes mellitus type 2, hypertension, end-stage renal disease.  The patient is

a possible candidate for kidney transplant.  EGD for dysphagia had shown mild

benign esophageal stenosis for which he underwent balloon dilation as well as

mild-to-moderate Candida esophagitis, erosive esophagitis and gastritis.



DESCRIPTION OF PROCEDURE:  Colonoscopy was done after getting informed consent

with MAC anesthesia.  Initial rectal exam was unremarkable.  Instrument was

passed through the rectum onto the cecum, which was identified by the ileocecal

valve and the appendiceal orifice.  Visualization was fair to good.  There was a

10-12 mm cecal sessile polyp noted in the cecum that was removed by hot snare

polypectomy and retrieved.  In addition, there was moderate proximal colon

diverticula.  The remaining part of the proximal colon, the transverse colon,

descending colon, and sigmoid showed normal mucosa.  There was no evidence of

any further polyps, colitis or diverticular disease and the rectum showed some

minor internal hemorrhoid.



ASSESSMENT:  Colon polyp screening, solitary cecal polyp noted, removed by snare

polypectomy and retrieved.  Moderate proximal colon diverticula, minor internal

hemorrhoid.  There was minimal bleeding associated with the procedure.  No

complications associated with the procedure.  The patient will be asked to take

fiber supplements.  Also, the patient will be treated with PPI and fluconazole

because of the EGD findings of mild-to-moderate Candida esophagitis and erosive

esophagitis and gastritis.  The patient will be asked to refrain from using

aspirin and aspirin-related products and anticoagulants for the next 5 days and

follow up in the office in 1-2 weeks' time.



The procedure was done in the GI lab with assistance of the GI lab team, which

included RN, Donna Fischer, dylon Brunner and with assistance of anesthesia.





DD: 09/18/2020 10:41

DT: 09/18/2020 10:47

JOB# [id number]  [id number]

EVERARDO/NATHEN

## 2020-09-19 VITALS — SYSTOLIC BLOOD PRESSURE: 136 MMHG | DIASTOLIC BLOOD PRESSURE: 50 MMHG

## 2022-08-15 ENCOUNTER — HOSPITAL ENCOUNTER (OUTPATIENT)
Dept: HOSPITAL 5 - MAMMO | Age: 77
Discharge: HOME | End: 2022-08-15
Attending: NURSE PRACTITIONER
Payer: MEDICARE

## 2022-08-15 DIAGNOSIS — Z78.0: ICD-10-CM

## 2022-08-15 DIAGNOSIS — M85.88: Primary | ICD-10-CM

## 2022-08-15 PROCEDURE — 77080 DXA BONE DENSITY AXIAL: CPT

## 2022-08-15 NOTE — MAMMOGRAPHY REPORT
DEXA BONE DENSITY SCAN



INDICATION / CLINICAL INFORMATION:

Z78.0.

77 years Female



COMPARISON: 

None available.



LUMBAR SPINE, L1-L4:

- Bone mineral density (BMD) = 0.884 g/cm2.

- T-score = -1.5 

- Z-score = 0.9 



Change (%) since most recent prior (if available):  None available.



Change (%) since most recent prior (if available):  None available.



LEFT HIP, NECK :

- Bone mineral density (BMD) = 0.587 g/cm2.

- T-score = -2.4 

- Z-score = -0.3 



Change (%) since most recent prior (if available):  None available.







IMPRESSION:

1. WHO Classification: Osteopenia. Fracture Risk: Increased. 

2. 10-Year Fracture Risk (FRAX) = Major Osteoporotic 16% / Hip: 4.6%





FRAX generally not reported for patients with normal or osteoporotic BMD, in non-steroid-treated julianne
ents younger than age 50, or in patients undergoing pharmacotherapy







=================================================================

BMD Reporting Guidelines (ISCD, 2015)

=================================================================

BMD Reporting in Postmenopausal Women and in Men Age 50 and Older

- T-scores are preferred.

- The WHO densitometric classification is applicable.



BMD Reporting in Females Prior to Menopause and in Males Younger Than Age 50

- Z-scores, not T-scores, are preferred. This is particularly important in children.

- A Z-score of -2.0 or lower is defined as below the expected range for age, and a Z-score above -2.0
 is within the expected range for age.

- Osteoporosis cannot be diagnosed in men under age 50 on the basis of BMD alone.

- The WHO diagnostic criteria may be applied to women in the menopausal transition.





http://www.iscd.org/official-positions/3540-nrhq-uslpsrnu-positions-adult/





Signer Name: Zach Ybarra DO 

Signed: 8/15/2022 4:33 PM

Workstation Name: [email or web address]